# Patient Record
Sex: FEMALE | Race: WHITE | NOT HISPANIC OR LATINO | Employment: OTHER | URBAN - METROPOLITAN AREA
[De-identification: names, ages, dates, MRNs, and addresses within clinical notes are randomized per-mention and may not be internally consistent; named-entity substitution may affect disease eponyms.]

---

## 2018-05-25 ENCOUNTER — HISTORICAL (OUTPATIENT)
Dept: RADIOLOGY | Facility: HOSPITAL | Age: 63
End: 2018-05-25

## 2018-05-28 ENCOUNTER — HISTORICAL (OUTPATIENT)
Dept: ADMINISTRATIVE | Facility: HOSPITAL | Age: 63
End: 2018-05-28

## 2019-07-05 ENCOUNTER — HISTORICAL (OUTPATIENT)
Dept: RADIOLOGY | Facility: HOSPITAL | Age: 64
End: 2019-07-05

## 2022-05-03 NOTE — HISTORICAL [facility] C[facility]ER
This is a historical note converted from Ivis. Formatting and pictures may have been removed.  Please reference Ivis for original formatting and attached multimedia. Chief Complaint  Rt foot pain, can fell on foot yesterday  History of Present Illness  62-year-old female presents with right foot pain.??Associated symptoms of increased pain with?toe and ankle movement,?and swelling.? Symptom onset began yesterday when?a full can of?clean guard?fell from height onto?right foot. ?Patient?elevating foot?taking over-the-counter medication without resolution of symptoms.? Daughter suggested she be assessed. Patient reports she has anti-inflammatory prescribed by her doctor to aid in inflammation, but hasnt taken any at this time. Patient did not take her ASA today.  Review of Systems  Constitutional_no fever, no fatigue, no weakness  Musculoskeletal_rt foot?pain  Integumentary_no skin rash or abnormal lesion  Genitourinary_no loss of bladder function  Gastrointestinal_no loss of bowel function  Neurologic_no headache, no dizziness, no peripheral weakness or numbness  ?  Physical Exam  Vitals & Measurements  T:?36.9? ?C (Oral)? HR:?83(Peripheral)? BP:?120/78? SpO2:?98%?  HT:?155?cm? HT:?155?cm? WT:?78?kg? WT:?78?kg? BMI:?32.47?  General_well-developed well-nourished in no acute distress  Cardiac_regular rate and rhythm without murmurs gallops or rubs, mild swelling noted?at surface of right?foot  Musculoskeletal_decreased range of motion right ankle,?and second through fourth digit?of right foot. ?Tenderness upon palpation at right dorsal surface?of foot  Integumentary_no rashes or skin lesions present, mild redness noted?at dorsal surface of right?foot  Neurologic_ cranial nerves intact, no signs of peripheral neurological deficit, motor/sensory function intact  ?  Assessment/Plan  1.?Right foot pain  Modify activity as necessary, gentle stretching suggested. Rest, ice, elevate, and apply compression bandage as  tolerated.  Use either ice pack for pain relief or localized heat to promote healing as needed  Use medications either over-the-counter or prescription as prescribed/needed  Contact this clinic if not improved with this treatment plan over the next 7-14 days  ?Will call with final results from radiology reading. Initial screening of xray negative for fractures.  Patient should HOLD aspirin and anti-inflammatory meds today, and restart meds tomorrow. Patient will be given small amount of medication for breakthru pain, may cause drowsiness.  Ordered:  ketorolac, 30 mg, IM, Once-NOW, first dose 05/28/18 10:40:00 CDT, stop date 05/28/18 10:40:00 CDT  traMADol, 50 mg = 1 tab(s), Oral, TID, PRN PRN pain, May cause drowsiness. Use for breakthru pain., X 3 day(s), # 9 tab(s), 0 Refill(s), Pharmacy: [facility] Alivia Band > or = 3 < 5/YD  PC, 05/28/18 10:44:00 CDT, [facility], Routine, 05/28/18 10:44:00 CDT  Office/Outpatient Visit Level 3 Established [id number] PC, Right foot pain, 05/28/18 10:48:00 CDT  XR Foot Right Minimum 3 Views, Routine, 05/28/18 10:11:00 CDT, Blunt Trauma, None, Stretcher, Patient Has IV?, Rad Type, Right foot pain, Not Scheduled, 05/28/18 10:11:00 CDT  ?   Problem List/Past Medical History  Ongoing  Acid reflux  Anxiety  Bipolar  Depression  Hypercholesterolemia  Hypertension  Obesity  Panic attack  Restless legs syndrome  Historical  Back pain  borderline diabetes  Bruises easily  Bulging disc  can lie flat  can walk 2 blocks briskly w/o CP/SOB  Cataracts  no cardiologist  wears contacts  Wears glasses  Procedure/Surgical History  20361 -  CATARACT W/IOL 1 STA PHACO (Left) (03/10/2015), Extracapsular cataract removal with insertion of intraocular lens prosthesis (1 stage procedure), manual or mechanical technique (eg, irrigation and aspiration or phacoemulsification) (03/10/2015), Insertion of intraocular lens prosthesis at time of cataract extraction, one-stage  (03/10/2015), Phacoemulsification and aspiration of cataract (03/10/2015), Cataract (2015), Excision Lesion (Right) (07/01/2014), Excision of lesion or tissue of conjunctiva (07/01/2014), Excision of lesion, conjunctiva; up to 1 cm. (07/01/2014), ORIF left ankle with bone graft, Tonsillectomy and adenoidectomy, Tubal ligation.  Medications  ALPRAZOLAM TAB 1MG, 1 mg= 1 tab(s), Oral, BID  ASPIRIN CHILD 81MG TAB CHEW, 81 mg= 1 tab(s), Oral, Daily  EZETIMIBE TAB 10MG  FAMOTIDINE TAB 40MG  PIOGLITAZONE TAB 15MG, 15 mg= 1 tab(s), Oral, Daily  ROSUVASTATIN CALCIUM 20 MG, 20 mg= 1 tab(s), Oral, qPM  Toradol for IM, 30 mg, IM, Once-NOW  traMADol 50 mg oral tablet, 50 mg= 1 tab(s), Oral, TID, PRN  VALSART/HCTZ -25MG, 1 tab(s), Oral, Daily  Allergies  Penicillin  Plavix?(unknown)  Social History  Alcohol - Denies Alcohol Use, 06/26/2014  Current, 1-2 times per week, 10/22/2015  Employment/School - No Risk, 03/10/2015  Employed, Highest education level: High school., 06/26/2014  Home/Environment - No Risk, 03/10/2015  Lives with Significant other., 03/10/2015  Nutrition/Health - No Risk, 03/10/2015  Regular, 03/10/2015  Substance Abuse - Denies Substance Abuse, 06/26/2014  Never, 05/28/2018  Tobacco - High Risk, 03/10/2015  Current every day smoker, Cigarettes, 06/26/2014  Family History  CAD (coronary artery disease)...: Father and Brother.  Hypertension.: Mother.

## 2023-10-22 ENCOUNTER — HOSPITAL ENCOUNTER (EMERGENCY)
Facility: HOSPITAL | Age: 68
Discharge: HOME | End: 2023-10-22
Attending: EMERGENCY MEDICINE
Payer: MEDICARE

## 2023-10-22 VITALS
TEMPERATURE: 98 F | DIASTOLIC BLOOD PRESSURE: 91 MMHG | SYSTOLIC BLOOD PRESSURE: 161 MMHG | WEIGHT: 155 LBS | RESPIRATION RATE: 20 BRPM | OXYGEN SATURATION: 99 % | HEIGHT: 61 IN | BODY MASS INDEX: 29.27 KG/M2 | HEART RATE: 57 BPM

## 2023-10-22 DIAGNOSIS — R06.02 SOB (SHORTNESS OF BREATH): ICD-10-CM

## 2023-10-22 DIAGNOSIS — S22.068A OTHER CLOSED FRACTURE OF EIGHTH THORACIC VERTEBRA, INITIAL ENCOUNTER: Primary | ICD-10-CM

## 2023-10-22 LAB
ALBUMIN SERPL-MCNC: 3.6 G/DL (ref 3.4–4.8)
ALBUMIN/GLOB SERPL: 1.2 RATIO (ref 1.1–2)
ALP SERPL-CCNC: 88 UNIT/L (ref 40–150)
ALT SERPL-CCNC: 16 UNIT/L (ref 0–55)
AST SERPL-CCNC: 22 UNIT/L (ref 5–34)
BASOPHILS # BLD AUTO: 0.02 X10(3)/MCL
BASOPHILS NFR BLD AUTO: 0.3 %
BILIRUB SERPL-MCNC: 0.5 MG/DL
BNP BLD-MCNC: 103.6 PG/ML
BUN SERPL-MCNC: 22.5 MG/DL (ref 9.8–20.1)
CALCIUM SERPL-MCNC: 9.4 MG/DL (ref 8.4–10.2)
CHLORIDE SERPL-SCNC: 106 MMOL/L (ref 98–107)
CO2 SERPL-SCNC: 22 MMOL/L (ref 23–31)
CREAT SERPL-MCNC: 0.8 MG/DL (ref 0.55–1.02)
EOSINOPHIL # BLD AUTO: 0.07 X10(3)/MCL (ref 0–0.9)
EOSINOPHIL NFR BLD AUTO: 1.2 %
ERYTHROCYTE [DISTWIDTH] IN BLOOD BY AUTOMATED COUNT: 13.6 % (ref 11.5–17)
GFR SERPLBLD CREATININE-BSD FMLA CKD-EPI: >60 MLS/MIN/1.73/M2
GLOBULIN SER-MCNC: 3 GM/DL (ref 2.4–3.5)
GLUCOSE SERPL-MCNC: 96 MG/DL (ref 82–115)
HCT VFR BLD AUTO: 42.2 % (ref 37–47)
HGB BLD-MCNC: 14.3 G/DL (ref 12–16)
IMM GRANULOCYTES # BLD AUTO: 0.04 X10(3)/MCL (ref 0–0.04)
IMM GRANULOCYTES NFR BLD AUTO: 0.7 %
LYMPHOCYTES # BLD AUTO: 1.65 X10(3)/MCL (ref 0.6–4.6)
LYMPHOCYTES NFR BLD AUTO: 27.3 %
MCH RBC QN AUTO: 34.8 PG (ref 27–31)
MCHC RBC AUTO-ENTMCNC: 33.9 G/DL (ref 33–36)
MCV RBC AUTO: 102.7 FL (ref 80–94)
MONOCYTES # BLD AUTO: 0.48 X10(3)/MCL (ref 0.1–1.3)
MONOCYTES NFR BLD AUTO: 7.9 %
NEUTROPHILS # BLD AUTO: 3.78 X10(3)/MCL (ref 2.1–9.2)
NEUTROPHILS NFR BLD AUTO: 62.6 %
NRBC BLD AUTO-RTO: 0 %
PLATELET # BLD AUTO: 216 X10(3)/MCL (ref 130–400)
PMV BLD AUTO: 10.1 FL (ref 7.4–10.4)
POTASSIUM SERPL-SCNC: 4.5 MMOL/L (ref 3.5–5.1)
PROT SERPL-MCNC: 6.6 GM/DL (ref 5.8–7.6)
RBC # BLD AUTO: 4.11 X10(6)/MCL (ref 4.2–5.4)
SODIUM SERPL-SCNC: 139 MMOL/L (ref 136–145)
TROPONIN I SERPL-MCNC: <0.01 NG/ML (ref 0–0.04)
WBC # SPEC AUTO: 6.04 X10(3)/MCL (ref 4.5–11.5)

## 2023-10-22 PROCEDURE — 83880 ASSAY OF NATRIURETIC PEPTIDE: CPT

## 2023-10-22 PROCEDURE — 25000003 PHARM REV CODE 250: Performed by: PHYSICIAN ASSISTANT

## 2023-10-22 PROCEDURE — 85025 COMPLETE CBC W/AUTO DIFF WBC: CPT

## 2023-10-22 PROCEDURE — 80053 COMPREHEN METABOLIC PANEL: CPT

## 2023-10-22 PROCEDURE — 99285 EMERGENCY DEPT VISIT HI MDM: CPT | Mod: 25

## 2023-10-22 PROCEDURE — 93010 ELECTROCARDIOGRAM REPORT: CPT | Mod: ,,, | Performed by: INTERNAL MEDICINE

## 2023-10-22 PROCEDURE — 84484 ASSAY OF TROPONIN QUANT: CPT

## 2023-10-22 PROCEDURE — 93010 EKG 12-LEAD: ICD-10-PCS | Mod: ,,, | Performed by: INTERNAL MEDICINE

## 2023-10-22 PROCEDURE — 93005 ELECTROCARDIOGRAM TRACING: CPT

## 2023-10-22 RX ORDER — OXYCODONE AND ACETAMINOPHEN 10; 325 MG/1; MG/1
1 TABLET ORAL ONCE
Status: COMPLETED | OUTPATIENT
Start: 2023-10-22 | End: 2023-10-22

## 2023-10-22 RX ORDER — HYDROCODONE BITARTRATE AND ACETAMINOPHEN 5; 325 MG/1; MG/1
1 TABLET ORAL EVERY 6 HOURS PRN
Qty: 12 TABLET | Refills: 0 | Status: SHIPPED | OUTPATIENT
Start: 2023-10-22 | End: 2023-11-07

## 2023-10-22 RX ADMIN — OXYCODONE HYDROCHLORIDE AND ACETAMINOPHEN 1 TABLET: 10; 325 TABLET ORAL at 03:10

## 2023-10-22 NOTE — FIRST PROVIDER EVALUATION
"Medical screening examination initiated.  I have conducted a focused provider triage encounter, findings are as follows:    Brief history of present illness:  68 year old female presents to ER with c/o lower back pain since fall on 10/03/23. Patient states that she was told that she a compression fracture in her back. Patient reports shortness of breath onset last week.     Vitals:    10/22/23 1058   BP: (!) 183/93   BP Location: Left arm   Patient Position: Sitting   Pulse: 80   Resp: 18   Temp: 98 °F (36.7 °C)   TempSrc: Oral   SpO2: 97%   Weight: 70.3 kg (155 lb)   Height: 5' 1" (1.549 m)       Pertinent physical exam:  Awake and alert, nad    Brief workup plan:  labs, imaging     Preliminary workup initiated; this workup will be continued and followed by the physician or advanced practice provider that is assigned to the patient when roomed.  "

## 2023-10-31 ENCOUNTER — TELEPHONE (OUTPATIENT)
Dept: NEUROSURGERY | Facility: CLINIC | Age: 68
End: 2023-10-31
Payer: MEDICARE

## 2023-10-31 DIAGNOSIS — S22.080A COMPRESSION FRACTURE OF T11 VERTEBRA, INITIAL ENCOUNTER: ICD-10-CM

## 2023-10-31 DIAGNOSIS — M51.34 BULGING OF THORACIC INTERVERTEBRAL DISC: Primary | ICD-10-CM

## 2023-11-01 NOTE — TELEPHONE ENCOUNTER
Please schedule the patient sooner rather than later on either my or Adelina's schedule. If she wishes to move forward with Dr. Camacho she will need AP/Lat views of the thoracici spine as well as AP/Lat/Flex/Ext views of the lumbar spine prior to her visit. Thanks

## 2023-11-03 NOTE — TELEPHONE ENCOUNTER
I tried to call the patient to see who she would like to move forward with. She did not answer so I LMOM.

## 2023-11-06 NOTE — TELEPHONE ENCOUNTER
The patient returned my call and spoke with Yoana. She stated she would like to move forward with Dr. Rodriguez. I spoke with the patient to get her scheduled. I scheduled her with Sagrario for 11/7/23 at 1:30. The patient was compliant w date and time.

## 2023-11-07 ENCOUNTER — OFFICE VISIT (OUTPATIENT)
Dept: NEUROSURGERY | Facility: CLINIC | Age: 68
End: 2023-11-07
Payer: MEDICARE

## 2023-11-07 VITALS
SYSTOLIC BLOOD PRESSURE: 115 MMHG | HEART RATE: 108 BPM | HEIGHT: 61 IN | WEIGHT: 154 LBS | RESPIRATION RATE: 16 BRPM | DIASTOLIC BLOOD PRESSURE: 80 MMHG | BODY MASS INDEX: 29.07 KG/M2

## 2023-11-07 DIAGNOSIS — M51.24 THORACIC DISC HERNIATION: ICD-10-CM

## 2023-11-07 DIAGNOSIS — S22.060D CLOSED WEDGE COMPRESSION FRACTURE OF T8 VERTEBRA WITH ROUTINE HEALING: Primary | ICD-10-CM

## 2023-11-07 PROCEDURE — 99204 OFFICE O/P NEW MOD 45 MIN: CPT | Mod: ,,, | Performed by: NURSE PRACTITIONER

## 2023-11-07 PROCEDURE — 99204 PR OFFICE/OUTPT VISIT, NEW, LEVL IV, 45-59 MIN: ICD-10-PCS | Mod: ,,, | Performed by: NURSE PRACTITIONER

## 2023-11-07 RX ORDER — ALENDRONATE SODIUM 70 MG/1
TABLET ORAL
COMMUNITY
Start: 2023-10-16 | End: 2023-11-20

## 2023-11-07 RX ORDER — VILAZODONE HYDROCHLORIDE 40 MG/1
TABLET ORAL DAILY
COMMUNITY
End: 2023-11-07

## 2023-11-07 RX ORDER — CELECOXIB 200 MG/1
200 CAPSULE ORAL 2 TIMES DAILY
COMMUNITY
Start: 2023-07-20 | End: 2023-11-07

## 2023-11-07 RX ORDER — ALPRAZOLAM 1 MG/1
1 TABLET ORAL 2 TIMES DAILY PRN
COMMUNITY
Start: 2023-10-30

## 2023-11-07 RX ORDER — ICOSAPENT ETHYL 1000 MG/1
CAPSULE ORAL 2 TIMES DAILY
COMMUNITY
End: 2023-11-07

## 2023-11-07 RX ORDER — EZETIMIBE 10 MG/1
10 TABLET ORAL DAILY
COMMUNITY
Start: 2023-10-04

## 2023-11-07 RX ORDER — DICLOFENAC SODIUM 75 MG/1
75 TABLET, DELAYED RELEASE ORAL 2 TIMES DAILY
COMMUNITY
Start: 2023-10-04

## 2023-11-07 RX ORDER — INCLISIRAN 284 MG/1.5ML
INJECTION, SOLUTION SUBCUTANEOUS
COMMUNITY
Start: 2023-10-24 | End: 2023-11-07

## 2023-11-07 RX ORDER — OXYCODONE AND ACETAMINOPHEN 5; 325 MG/1; MG/1
1 TABLET ORAL EVERY 12 HOURS PRN
COMMUNITY
End: 2023-11-07

## 2023-11-07 RX ORDER — FAMOTIDINE 40 MG/1
TABLET, FILM COATED ORAL DAILY
COMMUNITY
End: 2023-11-07

## 2023-11-07 RX ORDER — HYDROCODONE BITARTRATE AND ACETAMINOPHEN 10; 300 MG/1; MG/1
TABLET ORAL EVERY 6 HOURS PRN
COMMUNITY
Start: 2023-10-30 | End: 2024-01-10 | Stop reason: ALTCHOICE

## 2023-11-07 RX ORDER — ROSUVASTATIN CALCIUM 20 MG/1
TABLET, COATED ORAL DAILY
COMMUNITY

## 2023-11-07 RX ORDER — SERTRALINE HYDROCHLORIDE 100 MG/1
TABLET, FILM COATED ORAL DAILY
COMMUNITY
End: 2023-11-07

## 2023-11-07 RX ORDER — PANTOPRAZOLE SODIUM 20 MG/1
1 TABLET, DELAYED RELEASE ORAL DAILY
COMMUNITY
End: 2023-11-07

## 2023-11-07 RX ORDER — METHOCARBAMOL 750 MG/1
TABLET, FILM COATED ORAL 2 TIMES DAILY PRN
COMMUNITY
End: 2023-11-07

## 2023-11-07 RX ORDER — NAPROXEN SODIUM 220 MG/1
TABLET, FILM COATED ORAL DAILY
COMMUNITY
End: 2023-11-07

## 2023-11-07 RX ORDER — VALSARTAN AND HYDROCHLOROTHIAZIDE 320; 12.5 MG/1; MG/1
1 TABLET, FILM COATED ORAL DAILY
COMMUNITY

## 2023-11-07 RX ORDER — ARIPIPRAZOLE 5 MG/1
TABLET ORAL DAILY
COMMUNITY
End: 2023-11-07

## 2023-11-07 NOTE — PROGRESS NOTES
[facility]  History & Physical  Neurosurgery      Mirela Andrade   [id number]   1955       SUBJECTIVE:     CHIEF COMPLAINT:  Mid back pain    HPI:  Mireal Andrade is a 68 y.o. female who presents for neurosurgical evaluation at the recommendation of Dr. Paul. The patient presents today describing sharp stabbing throbbing and shooting mid back pain that radiates up into the parascapular region as well as into the left-greater-than-right flank and abdomen.  The patient states the symptoms began following a fall in her bathtub on 10/3/2023.  She also reports chronic right lower back pain with numbness and pain radiating into the right buttock and right leg.  The patient states these symptoms have been present since prior to her L3-S1 lumbar fusion and have progressed following her recent fall.  She reports chronic difficulty with her balance and feeling unsteady when walking.  She is denying any changes in bowel or bladder function today.  She rates her pain a 9/10 at this time.  She states she sees and mild relief with utilization of Norco.  She attempted to wear her prescribed TLSO brace although she states it was too uncomfortable.  She is not resting at night secondary to pain.      Past Medical History:   Diagnosis Date    Bipolar disorder, unspecified     Bulging of thoracic intervertebral disc     Closed fracture of one rib of right side     Compression fracture of T11 vertebra     Hepatic steatosis     HLD (hyperlipidemia)     HTN (hypertension)     Insomnia     Occlusion and stenosis of bilateral carotid arteries     Other idiopathic peripheral autonomic neuropathy     RLS (restless legs syndrome)        Past Surgical History:   Procedure Laterality Date    ANKLE SURGERY Left     HYSTERECTOMY      LUMBAR SPINE SURGERY      TONSILLECTOMY         History reviewed. No pertinent family history.    Social History     Socioeconomic History    Marital status:    Tobacco Use     "Smoking status: Every Day     Types: Cigarettes    Smokeless tobacco: Never        Review of patient's allergies indicates:   Allergen Reactions    Clopidogrel Other (See Comments)     Other reaction(s): protruding vessels, unknown    Penicillin      Other reaction(s): rash, hives    Ibuprofen Nausea Only and Other (See Comments)    Penicillins Other (See Comments) and Rash        Current Outpatient Medications   Medication Instructions    alendronate (FOSAMAX) 70 MG tablet Every 30 days    ALPRAZolam (XANAX) 1 mg, Oral, 2 times daily PRN    diclofenac (VOLTAREN) 75 mg, Oral, 2 times daily    ezetimibe (ZETIA) 10 mg, Oral, Daily    HYDROcodone-acetaminophen  mg Tab Every 6 hours PRN    rosuvastatin (CRESTOR) 20 MG tablet Daily    valsartan-hydrochlorothiazide (DIOVAN-HCT) 320-12.5 mg per tablet 1 tablet, Oral, Daily          Review of Systems   Constitutional:  Negative for chills, fever and weight loss.   HENT:  Negative for congestion, hearing loss, nosebleeds and tinnitus.    Eyes:  Negative for blurred vision, double vision and photophobia.   Respiratory:  Negative for cough, shortness of breath and wheezing.    Cardiovascular:  Negative for chest pain, palpitations and leg swelling.   Gastrointestinal:  Negative for constipation, diarrhea, nausea and vomiting.   Genitourinary:  Negative for dysuria, frequency and urgency.   Musculoskeletal:  Positive for back pain. Negative for falls and neck pain.   Skin:  Negative for itching and rash.   Neurological:  Positive for tingling. Negative for dizziness, tremors, sensory change, speech change, seizures, loss of consciousness and headaches.   Psychiatric/Behavioral:  Negative for depression, hallucinations and memory loss. The patient is not nervous/anxious.        OBJECTIVE:     Visit Vitals  /80 (BP Location: Left arm, Patient Position: Sitting)   Pulse 108   Resp 16   Ht 5' 1" (1.549 m)   Wt 69.9 kg (154 lb)   BMI 29.10 kg/m²        Physical " Exam    General:  Pleasant, Well-nourished, Well-groomed.    Cardiovascular:  Neck is supple.  There are no carotid bruits.  Heart has regular rate and rhythm.    Lungs:  Breathing is quiet, non-lablored    Abdomen:  Soft, non-tender, non-distended.    Neurological:  Muscle strength against resistance:   Right Left   Deltoid (C5) 5/5 5/5   Biceps (C5/6) 5/5 5/5   Wrist Flexors (C5/6) 5/5 5/5   Triceps (C7) 5/5 5/5   Wrist extension (C7) 5/5 5/5   Finger abduction (C8) 5/5 5/5    5/5 5/5        Hip abduction 5-/5 5-/5   Hip adduction 5-/5 5-/5   Hip flexion (L2) 5-/5 5-/5   Knee extension (L3) 5/5 5/5   Knee flexion (L4) 5/5 5/5   Dorsiflexion (L5) 5/5 5/5   EHL (L5) 5/5 5/5   Plantar flexion (S1) 5/5 5/5   Questionable effort on muscular exam secondary to pain  Sensation is intact to primary modalities in bilateral upper and lower extremities.    Reflexes:  Negative Babinski, Clonus, Avila, Tinel's, and Phalen's bilaterally.  Gait is  slow and cautious  Coordination is normal.  No tremor noted.    Imaging:  All pertinent neuroimaging independently reviewed. Discussed these findings in detail with the patient.    MRI of the thoracic spine dated 10/17/2023 reveal subacute compression fracture at T8 with right paracentral disc extrusion at T10-11 causing moderate dural sac indentation.  There does seem to be some compression at T10 as well without marrow edema.    ASSESSMENT:       ICD-10-CM ICD-9-CM   1. Closed wedge compression fracture of T8 vertebra with routine healing  S22.060D V54.17   2. Thoracic disc herniation  M51.24 722.11       PLAN:     1. Closed wedge compression fracture of T8 vertebra with routine healing  - Ambulatory referral/consult to Neurology; Future    2. Thoracic disc herniation    Mirela Andrade presents today describing intense stabbing pain mid back as well as spasming and radiation of pain into the abdomen.  I did take the time to review the patient's recent CT scans as well as  MRI with her in clinic today.  Her daughter accompanied her on this visit via speaker phone.  Given the intensity of the patient's symptoms I would like to refer her on for consultation regarding possible kyphoplasty with Dr. Dye.  We did discuss it is unclear at this time if some of her symptoms are in fact coming from the disc herniation seen at T10-11.  We will tentatively schedule the patient to follow up in clinic on an as-needed basis.  Should she be found to be a candidate for kyphoplasty and continue to have symptoms following this procedure, she was advised to notify our office at which time we will re-evaluate her symptoms regarding potential treatment options.  She was encouraged to attempt utilization of Tylenol during the day as she states she does not like to take the Norco that was previously prescribed.  I also emphasized the importance of remaining compliant with her brace as much as possible.  She and her daughter verbalize understanding.    Follow up if symptoms worsen or fail to improve.      E/M Level Based On Time:   15 minutes spent on reviewing chart, which includes interpreting lab results and diagnostic tests.   25 minutes spent in the room with the patient performing a history and physical exam, counseling or educating the patient/caregiver, prescribing medications, ordering labwork/diagnostic tests, or placing referrals.   5 minutes spent collaborating plan of care with physician.   5 minutes spent documenting all relevant clinical informationin the electronic health record.     Total Time Spent: 50 minutes       STANISLAV Woodard    Disclaimer:  This note is prepared using voice recognition software and as such is likely to have errors despite attempts at proofreading. Please contact me for questions.

## 2023-11-20 ENCOUNTER — OFFICE VISIT (OUTPATIENT)
Dept: NEUROLOGY | Facility: CLINIC | Age: 68
End: 2023-11-20
Payer: MEDICARE

## 2023-11-20 VITALS
DIASTOLIC BLOOD PRESSURE: 82 MMHG | BODY MASS INDEX: 29.07 KG/M2 | SYSTOLIC BLOOD PRESSURE: 148 MMHG | HEIGHT: 61 IN | WEIGHT: 154 LBS

## 2023-11-20 DIAGNOSIS — S22.060D CLOSED WEDGE COMPRESSION FRACTURE OF T8 VERTEBRA WITH ROUTINE HEALING: ICD-10-CM

## 2023-11-20 DIAGNOSIS — S22.060G COMPRESSION FRACTURE OF T8 VERTEBRA WITH DELAYED HEALING: ICD-10-CM

## 2023-11-20 DIAGNOSIS — S22.060A COMPRESSION FRACTURE OF T8 VERTEBRA, INITIAL ENCOUNTER: Primary | ICD-10-CM

## 2023-11-20 DIAGNOSIS — M80.08XA AGE-RELATED OSTEOPOROSIS WITH CURRENT PATHOLOGICAL FRACTURE, VERTEBRA(E), INITIAL ENCOUNTER FOR FRACTURE: ICD-10-CM

## 2023-11-20 PROCEDURE — 99204 OFFICE O/P NEW MOD 45 MIN: CPT | Mod: S$PBB,,, | Performed by: PSYCHIATRY & NEUROLOGY

## 2023-11-20 PROCEDURE — 99213 OFFICE O/P EST LOW 20 MIN: CPT | Mod: PBBFAC | Performed by: PSYCHIATRY & NEUROLOGY

## 2023-11-20 PROCEDURE — 99999 PR PBB SHADOW E&M-EST. PATIENT-LVL III: ICD-10-PCS | Mod: PBBFAC,,, | Performed by: PSYCHIATRY & NEUROLOGY

## 2023-11-20 PROCEDURE — 99999 PR PBB SHADOW E&M-EST. PATIENT-LVL III: CPT | Mod: PBBFAC,,, | Performed by: PSYCHIATRY & NEUROLOGY

## 2023-11-20 PROCEDURE — 99204 PR OFFICE/OUTPT VISIT, NEW, LEVL IV, 45-59 MIN: ICD-10-PCS | Mod: S$PBB,,, | Performed by: PSYCHIATRY & NEUROLOGY

## 2023-11-20 NOTE — PROGRESS NOTES
Neurology Office Visit  Neurology    Mirela Andrade is a 68 y.o. female for VCF.  S/p fall 1o/2023.  Imaging shows T8 VCF with edema.  Has tried conservative treatment, including LSO, without relief.  +Osteoporosis.    ROS:  Review of Systems   All other systems reviewed and are negative.     Past Medical History:   Diagnosis Date    Bipolar disorder, unspecified     Bulging of thoracic intervertebral disc     Closed fracture of one rib of right side     Compression fracture of T11 vertebra     Hepatic steatosis     HLD (hyperlipidemia)     HTN (hypertension)     Insomnia     Occlusion and stenosis of bilateral carotid arteries     Other idiopathic peripheral autonomic neuropathy     RLS (restless legs syndrome)      Past Surgical History:   Procedure Laterality Date    ANKLE SURGERY Left     HYSTERECTOMY      LUMBAR SPINE SURGERY      TONSILLECTOMY       History reviewed. No pertinent family history.  Review of patient's allergies indicates:   Allergen Reactions    Clopidogrel Other (See Comments)     Other reaction(s): protruding vessels, unknown    Penicillin      Other reaction(s): rash, hives    Ibuprofen Nausea Only and Other (See Comments)    Penicillins Other (See Comments) and Rash       Current Outpatient Medications:     ALPRAZolam (XANAX) 1 MG tablet, Take 1 mg by mouth 2 (two) times daily as needed., Disp: , Rfl:     diclofenac (VOLTAREN) 75 MG EC tablet, Take 75 mg by mouth 2 (two) times daily., Disp: , Rfl:     ezetimibe (ZETIA) 10 mg tablet, Take 10 mg by mouth Daily., Disp: , Rfl:     HYDROcodone-acetaminophen  mg Tab, every 6 (six) hours as needed., Disp: , Rfl:     rosuvastatin (CRESTOR) 20 MG tablet, Daily., Disp: , Rfl:     valsartan-hydrochlorothiazide (DIOVAN-HCT) 320-12.5 mg per tablet, Take 1 tablet by mouth once daily., Disp: , Rfl:   Outpatient Medications Marked as Taking for the 11/20/23 encounter (Office Visit) with Payam Dye MD   Medication Sig Dispense Refill     ALPRAZolam (XANAX) 1 MG tablet Take 1 mg by mouth 2 (two) times daily as needed.      diclofenac (VOLTAREN) 75 MG EC tablet Take 75 mg by mouth 2 (two) times daily.      ezetimibe (ZETIA) 10 mg tablet Take 10 mg by mouth Daily.      HYDROcodone-acetaminophen  mg Tab every 6 (six) hours as needed.      rosuvastatin (CRESTOR) 20 MG tablet Daily.      valsartan-hydrochlorothiazide (DIOVAN-HCT) 320-12.5 mg per tablet Take 1 tablet by mouth once daily.       Social History     Tobacco Use    Smoking status: Every Day     Types: Cigarettes    Smokeless tobacco: Never   Substance Use Topics    Alcohol use: Not Currently    Drug use: Never         Vitals:    11/20/23 0938   BP: (!) 148/82     Gen NAD  HEENT NC/AT  CV RRR, no carotid bruits  Resp clear  GI soft  Ext no C/C/E  Neuro  AAOx4  Speech fluent, appropriate  EOMI, PERRLA, VFF, no papilledema  Normal facial strength, sensation  Tongue and palate midline  Motor 5/5  Sensation TTP T8  DTRs symmetric  Coord intact  Gait Normal    Assessment: T8 VCF    Plan: Kyphoplasty

## 2023-11-27 ENCOUNTER — TELEPHONE (OUTPATIENT)
Dept: NEUROLOGY | Facility: CLINIC | Age: 68
End: 2023-11-27
Payer: MEDICARE

## 2023-11-27 NOTE — TELEPHONE ENCOUNTER
Pt called requesting c/b to discuss scheduling kyphoplasty.  Pt was advised that you are currently out of the clinic.    CB# [phone]

## 2023-11-28 NOTE — ED PROVIDER NOTES
Encounter Date: 10/22/2023       History     Chief Complaint   Patient presents with    Back Pain     Thoracic back pain since trip and fall 10/3. Had MRI done 10/8  and showed Thoracic compression fracture. Also c/o SOB, onset last week    Shortness of Breath     68yoF with history of thoracic vertebra fracture here for back pain. No meds taken today.     The history is provided by the patient. No  was used.   Back Pain   This is a chronic problem. The current episode started several weeks ago. The problem occurs constantly. The problem has been unchanged. The pain is associated with falling. The pain is present in the thoracic spine. The pain does not radiate. The pain is at a severity of 4/10. The symptoms are aggravated by bending. Pertinent negatives include no chest pain, no fever, no numbness, no bowel incontinence, no perianal numbness and no bladder incontinence. The treatment provided no relief.     Review of patient's allergies indicates:   Allergen Reactions    Clopidogrel Other (See Comments)     Other reaction(s): protruding vessels, unknown    Penicillin      Other reaction(s): rash, hives    Ibuprofen Nausea Only and Other (See Comments)    Penicillins Other (See Comments) and Rash     Past Medical History:   Diagnosis Date    Bipolar disorder, unspecified     Bulging of thoracic intervertebral disc     Closed fracture of one rib of right side     Compression fracture of T11 vertebra     Hepatic steatosis     HLD (hyperlipidemia)     HTN (hypertension)     Insomnia     Occlusion and stenosis of bilateral carotid arteries     Other idiopathic peripheral autonomic neuropathy     RLS (restless legs syndrome)      Past Surgical History:   Procedure Laterality Date    ANKLE SURGERY Left     HYSTERECTOMY      LUMBAR SPINE SURGERY      TONSILLECTOMY       No family history on file.  Social History     Tobacco Use    Smoking status: Every Day     Types: Cigarettes    Smokeless tobacco:  Never   Substance Use Topics    Alcohol use: Not Currently    Drug use: Never     Review of Systems   Constitutional:  Negative for fatigue and fever.   Cardiovascular:  Negative for chest pain.   Gastrointestinal:  Negative for bowel incontinence.   Genitourinary:  Negative for bladder incontinence.   Musculoskeletal:  Positive for back pain.   Neurological:  Negative for numbness.   All other systems reviewed and are negative.      Physical Exam     Initial Vitals [10/22/23 1058]   BP Pulse Resp Temp SpO2   (!) 183/93 80 18 98 °F (36.7 °C) 97 %      MAP       --         Physical Exam    Nursing note and vitals reviewed.  Constitutional: She appears well-developed.   Eyes: Pupils are equal, round, and reactive to light. Right eye exhibits no discharge.   Pulmonary/Chest: No respiratory distress. She has no wheezes.     Neurological: She is oriented to person, place, and time. She has normal reflexes.   CN grossly intact.PERRLA. FROM head and neck in all directions. 5/5 muscle strength in bilateral iliopsoas, quadriceps, dorsiflexion,plantar flexion, inversion, eversion, and hamstring function. Intact dermatomes lower extremities. Point tenderness over thoracic spine.         CLINICS CALLED OUT TO GIVE PATIENT TLSO BRACE    ED Course   Procedures  Labs Reviewed   COMPREHENSIVE METABOLIC PANEL - Abnormal; Notable for the following components:       Result Value    Carbon Dioxide 22 (*)     Blood Urea Nitrogen 22.5 (*)     All other components within normal limits   B-TYPE NATRIURETIC PEPTIDE - Abnormal; Notable for the following components:    Natriuretic Peptide 103.6 (*)     All other components within normal limits   CBC WITH DIFFERENTIAL - Abnormal; Notable for the following components:    RBC 4.11 (*)     .7 (*)     MCH 34.8 (*)     All other components within normal limits   TROPONIN I - Normal   CBC W/ AUTO DIFFERENTIAL    Narrative:     The following orders were created for panel order CBC auto  differential.  Procedure                               Abnormality         Status                     ---------                               -----------         ------                     CBC with Differential[[id number]]       Abnormal            Final result                 Please view results for these tests on the individual orders.        ECG Results              EKG 12-lead (Final result)  Result time 10/22/23 18:07:54      Final result by Interface, Lab In [facility] (10/22/23 18:07:54)                   Narrative:    Test Reason : R06.02,    Vent. Rate : 070 BPM     Atrial Rate : 070 BPM     P-R Int : 114 ms          QRS Dur : 074 ms      QT Int : 402 ms       P-R-T Axes : 034 -01 003 degrees     QTc Int : 434 ms    Sinus rhythm with Premature supraventricular complexes  Baseline/motion artifact  Borderline Abnormal ECG  No previous ECGs available  Confirmed by Catrachito Leija MD ([id number]) on 10/22/2023 6:07:44 PM    Referred By:             Confirmed By:Catrachito Leija MD                                     EKG 12-LEAD (Final result)  Result time 10/28/23 12:58:22      Final result by Unknown User (10/28/23 12:58:22)                                      Imaging Results              CT Thoracic Spine Without Contrast (Final result)  Result time 10/22/23 11:57:30      Final result by Judy Marcano MD (10/22/23 11:57:30)                   Impression:      Age indeterminate inferior endplate insufficiency fractures at T8 and T10 with mild loss of height.    Right central disc protrusion at T10-T11 with narrowing of the right lateral recess      Electronically signed by: Judy Marcano  Date:    10/22/2023  Time:    11:57               Narrative:    EXAMINATION:  CT THORACIC SPINE WITHOUT CONTRAST    CLINICAL HISTORY:  Mid-back pain, compression fracture suspected;    TECHNIQUE:  Low dose helical acquired images with axial, sagittal and coronal reformations though the thoracic spine.  Contrast was not  administered.    All CT scans at this location are performed using dose optimization techniques as appropriate to a performed exam including the following automated exposure control, adjustment of the mA and/or kV according to patient size and/or use of iterative reconstruction technique    DLP: 1484 mGycm    COMPARISON:  CT chest 07/05/2019    FINDINGS:  NUMBERING: There are diminutive 12th ribs.    BONES: 20% loss of height with sclerosis at the inferior endplate of T8, age indeterminate but new from 2019 exam.  10% loss of height with sclerosis at the inferior endplate of T10, age indeterminate but new from 2019 exam.  Normal alignment.    DISCS: Right central disc protrusion at T10-T11 measuring approximately 7 mm.  This narrows the right lateral recess.    SOFT TISSUES: Regional soft tissues are unremarkable.                                       CT Lumbar Spine Without Contrast (Final result)  Result time 10/22/23 11:52:20      Final result by José Miguel Roberto MD (10/22/23 11:52:20)                   Impression:      No acute traumatic injury identified.      Electronically signed by: José Miguel Roberto  Date:    10/22/2023  Time:    11:52               Narrative:    EXAMINATION:  CT LUMBAR SPINE WITHOUT CONTRAST    CLINICAL HISTORY:  Low back pain, trauma;    TECHNIQUE:  Noncontrast CT images of the lumbar spine. Axial, coronal, and sagittal reformatted images are reviewed. Dose length product is 1484 mGycm. Automatic exposure control, adjustment of mA/kV or iterative reconstruction technique was used to limit radiation dose.    COMPARISON:  CT 02/02/2017    FINDINGS:  Transpedicular screws and bilateral fixation rods from L3 through the sacrum with interbody implants.  No acute fracture identified.  No significant listhesis.  Moderate degenerative changes at L2-3.  Numerous aortic and iliac artery calcifications.                                       X-Ray Chest PA And Lateral (Final result)  Result time  10/22/23 11:20:20      Final result by José Miguel Roberto MD (10/22/23 11:20:20)                   Impression:      No acute pulmonary process appreciated.      Electronically signed by: José Miguel Roberto  Date:    10/22/2023  Time:    11:20               Narrative:    EXAMINATION:  XR CHEST PA AND LATERAL    CLINICAL HISTORY:  Shortness of breath    TECHNIQUE:  PA and lateral radiographs of the chest    COMPARISON:  Radiography 2016    FINDINGS:  Normal cardiac silhouette. The lungs are well-inflated. No consolidation identified. No pleural effusion or discernible pneumothorax.                                    X-Rays:   Independently Interpreted Readings:   Other Readings:  DISC PROTRUSION AT T10-T11.    Medications   oxyCODONE-acetaminophen  mg per tablet 1 tablet (1 tablet Oral Given 10/22/23 1521)     Medical Decision Making  68yoF with history of thoracic vertebra fracture here for back pain. No meds taken today.  NO obvious fracture on CT but brace given prior to discharge to give support.     Problems Addressed:  Other closed fracture of eighth thoracic vertebra, initial encounter: acute illness or injury    Risk  Prescription drug management.  Decision regarding hospitalization.                                      Clinical Impression:  Final diagnoses:  [R06.02] SOB (shortness of breath)  [S22.068A] Other closed fracture of eighth thoracic vertebra, initial encounter (Primary)          ED Disposition Condition    Discharge Stable          ED Prescriptions       Medication Sig Dispense Start Date End Date Auth. Provider    HYDROcodone-acetaminophen (NORCO) 5-325 mg per tablet () Take 1 tablet by mouth every 6 (six) hours as needed for Pain. 12 tablet 10/22/2023 2023 Phylicia Draper PA          Follow-up Information       Follow up With Specialties Details Why Contact Info    Michael Paul MD Family Medicine  As needed, If symptoms worsen [address] [place]  [id number]               Phylicia Draper PA  11/28/23 1213

## 2023-11-30 ENCOUNTER — HOSPITAL ENCOUNTER (OUTPATIENT)
Dept: INTERVENTIONAL RADIOLOGY/VASCULAR | Facility: HOSPITAL | Age: 68
Discharge: HOME | End: 2023-11-30
Attending: PSYCHIATRY & NEUROLOGY | Admitting: PSYCHIATRY & NEUROLOGY
Payer: MEDICARE

## 2023-11-30 ENCOUNTER — ANESTHESIA EVENT (OUTPATIENT)
Dept: INTERVENTIONAL RADIOLOGY/VASCULAR | Facility: HOSPITAL | Age: 68
End: 2023-11-30
Payer: MEDICARE

## 2023-11-30 VITALS
DIASTOLIC BLOOD PRESSURE: 91 MMHG | OXYGEN SATURATION: 97 % | HEART RATE: 86 BPM | HEIGHT: 61 IN | BODY MASS INDEX: 28.05 KG/M2 | SYSTOLIC BLOOD PRESSURE: 161 MMHG | RESPIRATION RATE: 13 BRPM | WEIGHT: 148.56 LBS

## 2023-11-30 DIAGNOSIS — M80.08XA AGE-RELATED OSTEOPOROSIS WITH CURRENT PATHOLOGICAL FRACTURE, VERTEBRA(E), INITIAL ENCOUNTER FOR FRACTURE: ICD-10-CM

## 2023-11-30 DIAGNOSIS — S22.060A: ICD-10-CM

## 2023-11-30 DIAGNOSIS — S22.060D CLOSED WEDGE COMPRESSION FRACTURE OF T8 VERTEBRA WITH ROUTINE HEALING: ICD-10-CM

## 2023-11-30 DIAGNOSIS — S22.060A COMPRESSION FRACTURE OF T8 VERTEBRA, INITIAL ENCOUNTER: ICD-10-CM

## 2023-11-30 DIAGNOSIS — S22.060G COMPRESSION FRACTURE OF T8 VERTEBRA WITH DELAYED HEALING: ICD-10-CM

## 2023-11-30 LAB
ANION GAP SERPL CALC-SCNC: 11 MEQ/L
BASOPHILS # BLD AUTO: 0.03 X10(3)/MCL
BASOPHILS NFR BLD AUTO: 0.4 %
BUN SERPL-MCNC: 17.8 MG/DL (ref 9.8–20.1)
CALCIUM SERPL-MCNC: 9.6 MG/DL (ref 8.4–10.2)
CHLORIDE SERPL-SCNC: 105 MMOL/L (ref 98–107)
CO2 SERPL-SCNC: 22 MMOL/L (ref 23–31)
CREAT SERPL-MCNC: 0.75 MG/DL (ref 0.55–1.02)
CREAT/UREA NIT SERPL: 24
EOSINOPHIL # BLD AUTO: 0.03 X10(3)/MCL (ref 0–0.9)
EOSINOPHIL NFR BLD AUTO: 0.4 %
ERYTHROCYTE [DISTWIDTH] IN BLOOD BY AUTOMATED COUNT: 13 % (ref 11.5–17)
GFR SERPLBLD CREATININE-BSD FMLA CKD-EPI: >60 MLS/MIN/1.73/M2
GLUCOSE SERPL-MCNC: 110 MG/DL (ref 82–115)
HCT VFR BLD AUTO: 45.8 % (ref 37–47)
HGB BLD-MCNC: 15.1 G/DL (ref 12–16)
IMM GRANULOCYTES # BLD AUTO: 0.02 X10(3)/MCL (ref 0–0.04)
IMM GRANULOCYTES NFR BLD AUTO: 0.3 %
INR PPP: 0.9
LYMPHOCYTES # BLD AUTO: 1.57 X10(3)/MCL (ref 0.6–4.6)
LYMPHOCYTES NFR BLD AUTO: 21.8 %
MCH RBC QN AUTO: 33.9 PG (ref 27–31)
MCHC RBC AUTO-ENTMCNC: 33 G/DL (ref 33–36)
MCV RBC AUTO: 102.9 FL (ref 80–94)
MONOCYTES # BLD AUTO: 0.54 X10(3)/MCL (ref 0.1–1.3)
MONOCYTES NFR BLD AUTO: 7.5 %
NEUTROPHILS # BLD AUTO: 5.01 X10(3)/MCL (ref 2.1–9.2)
NEUTROPHILS NFR BLD AUTO: 69.6 %
NRBC BLD AUTO-RTO: 0 %
PLATELET # BLD AUTO: 218 X10(3)/MCL (ref 130–400)
PMV BLD AUTO: 9.7 FL (ref 7.4–10.4)
POTASSIUM SERPL-SCNC: 4.5 MMOL/L (ref 3.5–5.1)
PROTHROMBIN TIME: 12.1 SECONDS (ref 12.5–14.5)
RBC # BLD AUTO: 4.45 X10(6)/MCL (ref 4.2–5.4)
SODIUM SERPL-SCNC: 138 MMOL/L (ref 136–145)
WBC # SPEC AUTO: 7.2 X10(3)/MCL (ref 4.5–11.5)

## 2023-11-30 PROCEDURE — D9220A PRA ANESTHESIA: ICD-10-PCS | Mod: ANES,,, | Performed by: ANESTHESIOLOGY

## 2023-11-30 PROCEDURE — C1713 ANCHOR/SCREW BN/BN,TIS/BN: HCPCS | Performed by: PSYCHIATRY & NEUROLOGY

## 2023-11-30 PROCEDURE — 80048 BASIC METABOLIC PNL TOTAL CA: CPT | Performed by: PSYCHIATRY & NEUROLOGY

## 2023-11-30 PROCEDURE — 37000008 HC ANESTHESIA 1ST 15 MINUTES: Performed by: PSYCHIATRY & NEUROLOGY

## 2023-11-30 PROCEDURE — 85025 COMPLETE CBC W/AUTO DIFF WBC: CPT | Performed by: PSYCHIATRY & NEUROLOGY

## 2023-11-30 PROCEDURE — D9220A PRA ANESTHESIA: ICD-10-PCS | Mod: CRNA,,, | Performed by: NURSE ANESTHETIST, CERTIFIED REGISTERED

## 2023-11-30 PROCEDURE — C1726 CATH, BAL DIL, NON-VASCULAR: HCPCS | Performed by: PSYCHIATRY & NEUROLOGY

## 2023-11-30 PROCEDURE — D9220A PRA ANESTHESIA: Mod: CRNA,,, | Performed by: NURSE ANESTHETIST, CERTIFIED REGISTERED

## 2023-11-30 PROCEDURE — 22513 PERQ VERTEBRAL AUGMENTATION: CPT | Mod: ,,, | Performed by: PSYCHIATRY & NEUROLOGY

## 2023-11-30 PROCEDURE — D9220A PRA ANESTHESIA: Mod: ANES,,, | Performed by: ANESTHESIOLOGY

## 2023-11-30 PROCEDURE — 27201423 OPTIME MED/SURG SUP & DEVICES STERILE SUPPLY: Performed by: PSYCHIATRY & NEUROLOGY

## 2023-11-30 PROCEDURE — 22513 PERQ VERTEBRAL AUGMENTATION: CPT

## 2023-11-30 PROCEDURE — 85610 PROTHROMBIN TIME: CPT | Performed by: PSYCHIATRY & NEUROLOGY

## 2023-11-30 PROCEDURE — 63600175 PHARM REV CODE 636 W HCPCS: Performed by: NURSE ANESTHETIST, CERTIFIED REGISTERED

## 2023-11-30 PROCEDURE — 63600175 PHARM REV CODE 636 W HCPCS: Performed by: ANESTHESIOLOGY

## 2023-11-30 PROCEDURE — 88304 TISSUE EXAM BY PATHOLOGIST: CPT | Performed by: PSYCHIATRY & NEUROLOGY

## 2023-11-30 PROCEDURE — 25000003 PHARM REV CODE 250: Performed by: NURSE ANESTHETIST, CERTIFIED REGISTERED

## 2023-11-30 PROCEDURE — 22513 PR PERQ VERTEBRAL AUGMENTATION UNI/BILAT THORACIC: ICD-10-PCS | Mod: ,,, | Performed by: PSYCHIATRY & NEUROLOGY

## 2023-11-30 PROCEDURE — 37000009 HC ANESTHESIA EA ADD 15 MINS: Performed by: PSYCHIATRY & NEUROLOGY

## 2023-11-30 PROCEDURE — 25000003 PHARM REV CODE 250: Performed by: PSYCHIATRY & NEUROLOGY

## 2023-11-30 PROCEDURE — 22513 PERQ VERTEBRAL AUGMENTATION: CPT | Performed by: PSYCHIATRY & NEUROLOGY

## 2023-11-30 RX ORDER — HYDROMORPHONE HYDROCHLORIDE 2 MG/ML
0.4 INJECTION, SOLUTION INTRAMUSCULAR; INTRAVENOUS; SUBCUTANEOUS EVERY 5 MIN PRN
Status: DISCONTINUED | OUTPATIENT
Start: 2023-11-30 | End: 2023-12-01 | Stop reason: HOSPADM

## 2023-11-30 RX ORDER — SODIUM CHLORIDE, SODIUM LACTATE, POTASSIUM CHLORIDE, CALCIUM CHLORIDE 600; 310; 30; 20 MG/100ML; MG/100ML; MG/100ML; MG/100ML
INJECTION, SOLUTION INTRAVENOUS CONTINUOUS
Status: CANCELLED | OUTPATIENT
Start: 2023-11-30

## 2023-11-30 RX ORDER — ONDANSETRON 4 MG/1
4 TABLET, ORALLY DISINTEGRATING ORAL EVERY 6 HOURS PRN
Status: CANCELLED | OUTPATIENT
Start: 2023-11-30

## 2023-11-30 RX ORDER — CEFAZOLIN SODIUM 1 G/3ML
INJECTION, POWDER, FOR SOLUTION INTRAMUSCULAR; INTRAVENOUS
Status: DISCONTINUED | OUTPATIENT
Start: 2023-11-30 | End: 2023-11-30

## 2023-11-30 RX ORDER — SODIUM CITRATE AND CITRIC ACID MONOHYDRATE 334; 500 MG/5ML; MG/5ML
30 SOLUTION ORAL
Status: CANCELLED | OUTPATIENT
Start: 2023-11-30

## 2023-11-30 RX ORDER — MEPERIDINE HYDROCHLORIDE 25 MG/ML
12.5 INJECTION INTRAMUSCULAR; INTRAVENOUS; SUBCUTANEOUS ONCE AS NEEDED
Status: DISCONTINUED | OUTPATIENT
Start: 2023-11-30 | End: 2023-12-01 | Stop reason: HOSPADM

## 2023-11-30 RX ORDER — PROPOFOL 10 MG/ML
VIAL (ML) INTRAVENOUS
Status: DISCONTINUED | OUTPATIENT
Start: 2023-11-30 | End: 2023-11-30

## 2023-11-30 RX ORDER — HYDRALAZINE HYDROCHLORIDE 20 MG/ML
10 INJECTION INTRAMUSCULAR; INTRAVENOUS ONCE
Status: DISCONTINUED | OUTPATIENT
Start: 2023-11-30 | End: 2023-12-01 | Stop reason: HOSPADM

## 2023-11-30 RX ORDER — MIDAZOLAM HYDROCHLORIDE 1 MG/ML
2 INJECTION INTRAMUSCULAR; INTRAVENOUS ONCE AS NEEDED
Status: CANCELLED | OUTPATIENT
Start: 2023-11-30 | End: 2035-04-28

## 2023-11-30 RX ORDER — ACETAMINOPHEN 10 MG/ML
1000 INJECTION, SOLUTION INTRAVENOUS ONCE
Status: DISCONTINUED | OUTPATIENT
Start: 2023-11-30 | End: 2023-12-01 | Stop reason: HOSPADM

## 2023-11-30 RX ORDER — LIDOCAINE HYDROCHLORIDE 20 MG/ML
INJECTION INTRAVENOUS
Status: DISCONTINUED | OUTPATIENT
Start: 2023-11-30 | End: 2023-11-30

## 2023-11-30 RX ORDER — HYDROCODONE BITARTRATE AND ACETAMINOPHEN 10; 325 MG/1; MG/1
1 TABLET ORAL EVERY 6 HOURS PRN
Qty: 16 TABLET | Refills: 0 | Status: SHIPPED | OUTPATIENT
Start: 2023-11-30 | End: 2024-01-10 | Stop reason: ALTCHOICE

## 2023-11-30 RX ORDER — ROCURONIUM BROMIDE 10 MG/ML
INJECTION, SOLUTION INTRAVENOUS
Status: DISCONTINUED | OUTPATIENT
Start: 2023-11-30 | End: 2023-11-30

## 2023-11-30 RX ORDER — LIDOCAINE HYDROCHLORIDE 10 MG/ML
1 INJECTION, SOLUTION EPIDURAL; INFILTRATION; INTRACAUDAL; PERINEURAL ONCE
Status: CANCELLED | OUTPATIENT
Start: 2023-11-30 | End: 2023-11-30

## 2023-11-30 RX ORDER — LIDOCAINE HYDROCHLORIDE 20 MG/ML
INJECTION, SOLUTION INFILTRATION; PERINEURAL
Status: COMPLETED | OUTPATIENT
Start: 2023-11-30 | End: 2023-11-30

## 2023-11-30 RX ORDER — FENTANYL CITRATE 50 UG/ML
INJECTION, SOLUTION INTRAMUSCULAR; INTRAVENOUS
Status: DISCONTINUED | OUTPATIENT
Start: 2023-11-30 | End: 2023-11-30

## 2023-11-30 RX ADMIN — FENTANYL CITRATE 50 MCG: 50 INJECTION, SOLUTION INTRAMUSCULAR; INTRAVENOUS at 11:11

## 2023-11-30 RX ADMIN — CEFAZOLIN 2 G: 330 INJECTION, POWDER, FOR SOLUTION INTRAMUSCULAR; INTRAVENOUS at 11:11

## 2023-11-30 RX ADMIN — ROCURONIUM BROMIDE 50 MG: 10 SOLUTION INTRAVENOUS at 11:11

## 2023-11-30 RX ADMIN — HYDROMORPHONE HYDROCHLORIDE 0.4 MG: 2 INJECTION INTRAMUSCULAR; INTRAVENOUS; SUBCUTANEOUS at 12:11

## 2023-11-30 RX ADMIN — FENTANYL CITRATE 100 MCG: 50 INJECTION, SOLUTION INTRAMUSCULAR; INTRAVENOUS at 11:11

## 2023-11-30 RX ADMIN — LIDOCAINE HYDROCHLORIDE 5 ML: 20 INJECTION, SOLUTION INFILTRATION; PERINEURAL at 11:11

## 2023-11-30 RX ADMIN — PROPOFOL 150 MG: 10 INJECTION, EMULSION INTRAVENOUS at 11:11

## 2023-11-30 RX ADMIN — LIDOCAINE HYDROCHLORIDE 60 MG: 20 INJECTION INTRAVENOUS at 11:11

## 2023-11-30 RX ADMIN — SODIUM CHLORIDE, SODIUM GLUCONATE, SODIUM ACETATE, POTASSIUM CHLORIDE AND MAGNESIUM CHLORIDE: 526; 502; 368; 37; 30 INJECTION, SOLUTION INTRAVENOUS at 11:11

## 2023-11-30 NOTE — NURSING
The patient's daughter stated that she has not received any call from the doctor after the procedure, ELENI Logan notified and I was told Dr. Dye has left for the day. ELENI Logan called  patient's daughter but apparently she did not answer the call. Patient and daughter informed that Dr. Dye will not be speaking to them before discharge but Dr. Dye's office number is provided to patient and daughter to call should they have any questions about the procedure done today.

## 2023-11-30 NOTE — ANESTHESIA PROCEDURE NOTES
Intubation    Date/Time: 11/30/2023 11:16 AM    Performed by: Lou Gupta CRNA  Authorized by: Zhao Irving MD    Intubation:     Induction:  Intravenous    Intubated:  Postinduction    Mask Ventilation:  Easy mask    Attempts:  1    Attempted By:  CRNA    Method of Intubation:  Direct    Blade:  Spence 2    Laryngeal View Grade: Grade IIA - cords partially seen      Difficult Airway Encountered?: No      Complications:  None    Airway Device:  Oral endotracheal tube    Airway Device Size:  7.0    Style/Cuff Inflation:  Cuffed (inflated to minimal occlusive pressure)    Inflation Amount (mL):  6    Tube secured:  21    Secured at:  The lips    Placement Verified By:  Capnometry    Complicating Factors:  None    Findings Post-Intubation:  BS equal bilateral

## 2023-11-30 NOTE — DISCHARGE INSTRUCTIONS
Get lots of rest. Stay in bed for at least 24 hours.  Be sure to take all your drugs as ordered by your doctor.  You may have to limit your activity. You can slowly work up to your normal activities. .  Ask your doctor about proper posture and how to keep a healthy back. An exercise program may be advised such as gentle stretching, conditioning, and strengthening.  Place an ice pack or a bag of frozen peas wrapped in a towel over the painful part. Never put ice right on the skin. Do not leave the ice on more than 10 to 15 minutes at a time.  Do not take a soaking bath for 5 days. Can take a shower after 24 hours  No heavy lifting is allowed for 6 weeks after the procedure.  You may have hard stools due to a regular use of drugs for pain. Drink lots of fluids. Eat high-fiber foods, such as fresh fruits.

## 2023-11-30 NOTE — ANESTHESIA PREPROCEDURE EVALUATION
"                                                                                                             11/30/2023  Mirela Andraed is a 68 y.o., female with cad, pvd, and other medical problems listed in the EMR      Pre-op Assessment    I have reviewed the Patient Summary Reports.     I have reviewed the Nursing Notes. I have reviewed the NPO Status.   I have reviewed the Medications.     Review of Systems  Cardiovascular:  Exercise tolerance: good                                               Physical Exam  General: Well nourished and Cooperative    Airway:  Mallampati: II   Mouth Opening: Normal  TM Distance: Normal  Tongue: Normal  Neck ROM: Normal ROM    Dental:  Intact    Chest/Lungs:  Clear to auscultation    Heart:  Rhythm: Regular Rhythm        Anesthesia Plan  Type of Anesthesia, risks & benefits discussed:    Anesthesia Type: Gen ETT  Intra-op Monitoring Plan: Standard ASA Monitors  Post Op Pain Control Plan: multimodal analgesia  Induction:  IV  Informed Consent: Informed consent signed with the Patient and all parties understand the risks and agree with anesthesia plan.  All questions answered.   ASA Score: 2  Day of Surgery Review of History & Physical: H&P Update referred to the surgeon/provider.    Ready For Surgery From Anesthesia Perspective.     .  I explained anesthesia plan to patient/responsbile party if available.  Anesthesia consent done going over the material facts, risks, complications & alternatives, obtained which includes the possibility of altering the anesthesia plan.  I reviewed problem list, prior to admission medication list, appropriate labs, any workup, Xray, EKG etc noted below.  Patients condition is satisfactory to proceed with anesthesia plan unless otherwise noted (see anesthesia chart for details of the anesthesia plan carried out).      Pre-operative evaluation for * No procedures listed *    BP (!) 151/89   Pulse 89   Ht 5' 1" (1.549 m)   Wt 67.4 kg (148 lb " 9.4 oz)   LMP  (LMP Unknown)   SpO2 98%   BMI 28.08 kg/m²     There is no problem list on file for this patient.      Review of patient's allergies indicates:   Allergen Reactions    Clopidogrel Other (See Comments)     Other reaction(s): protruding vessels, unknown    Penicillin      Other reaction(s): rash, hives    Ibuprofen Nausea Only and Other (See Comments)    Penicillins Other (See Comments) and Rash       Current Outpatient Medications   Medication Instructions    ALPRAZolam (XANAX) 1 mg, Oral, 2 times daily PRN    diclofenac (VOLTAREN) 75 mg, Oral, 2 times daily    ezetimibe (ZETIA) 10 mg, Oral, Daily    HYDROcodone-acetaminophen  mg Tab Every 6 hours PRN    rosuvastatin (CRESTOR) 20 MG tablet Daily    valsartan-hydrochlorothiazide (DIOVAN-HCT) 320-12.5 mg per tablet 1 tablet, Oral, Daily       Past Surgical History:   Procedure Laterality Date    ANKLE SURGERY Left     HYSTERECTOMY      LUMBAR SPINE SURGERY      TONSILLECTOMY         Social History     Socioeconomic History    Marital status:    Tobacco Use    Smoking status: Every Day     Types: Cigarettes    Smokeless tobacco: Never   Substance and Sexual Activity    Alcohol use: Not Currently    Drug use: Never    Sexual activity: Not Currently       Lab Results   Component Value Date    WBC 7.20 11/30/2023    HGB 15.1 11/30/2023    HCT 45.8 11/30/2023    .9 (H) 11/30/2023     11/30/2023          BMP  Lab Results   Component Value Date    HCT 45.8 11/30/2023     11/30/2023    K 4.5 11/30/2023    BUN 17.8 11/30/2023    CREATININE 0.75 11/30/2023    CALCIUM 9.6 11/30/2023        INR  Recent Labs     11/30/23  0828   INR 0.9   PROTIME 12.1*           Diagnostic Studies:      EKG:  Results for orders placed or performed during the hospital encounter of 10/22/23   EKG 12-lead    Collection Time: 10/22/23 10:57 AM    Narrative    Test Reason : R06.02,    Vent. Rate : 070 BPM     Atrial Rate : 070 BPM     P-R Int : 114 ms           QRS Dur : 074 ms      QT Int : 402 ms       P-R-T Axes : 034 -01 003 degrees     QTc Int : 434 ms    Sinus rhythm with Premature supraventricular complexes  Baseline/motion artifact  Borderline Abnormal ECG  No previous ECGs available  Confirmed by Catrachito Leija MD ([id number]) on 10/22/2023 6:07:44 PM    Referred By:             Confirmed By:Catrachito Leija MD

## 2023-11-30 NOTE — DISCHARGE SUMMARY
[facility] - Interventional Radiology  Discharge Note  Short Stay    IR Kyphoplasty Thoracic First Vertebral      OUTCOME: Patient tolerated treatment/procedure well without complication and is now ready for discharge.    DISPOSITION: Home or Self Care    FINAL DIAGNOSIS:  <principal problem not specified>    FOLLOWUP: In clinic    DISCHARGE INSTRUCTIONS:  No discharge procedures on file.     TIME SPENT ON DISCHARGE: 31 minutes

## 2023-11-30 NOTE — H&P
Pre-Operative History and Physical   Interventional Neurology    Mirela Andrade is a 68 y.o. female here for kyphoplasty.    ROS:  Review of Systems   All other systems reviewed and are negative.       Past Medical History:   Diagnosis Date    Bipolar disorder, unspecified     Bulging of thoracic intervertebral disc     Closed fracture of one rib of right side     Compression fracture of T11 vertebra     Hepatic steatosis     HLD (hyperlipidemia)     HTN (hypertension)     Insomnia     Occlusion and stenosis of bilateral carotid arteries     Other idiopathic peripheral autonomic neuropathy     RLS (restless legs syndrome)      Past Surgical History:   Procedure Laterality Date    ANKLE SURGERY Left     HYSTERECTOMY      LUMBAR SPINE SURGERY      TONSILLECTOMY       History reviewed. No pertinent family history.  Review of patient's allergies indicates:   Allergen Reactions    Clopidogrel Other (See Comments)     Other reaction(s): protruding vessels, unknown    Penicillin      Other reaction(s): rash, hives    Ibuprofen Nausea Only and Other (See Comments)    Penicillins Other (See Comments) and Rash       Current Outpatient Medications:     ALPRAZolam (XANAX) 1 MG tablet, Take 1 mg by mouth 2 (two) times daily as needed., Disp: , Rfl:     diclofenac (VOLTAREN) 75 MG EC tablet, Take 75 mg by mouth 2 (two) times daily., Disp: , Rfl:     HYDROcodone-acetaminophen  mg Tab, every 6 (six) hours as needed., Disp: , Rfl:     valsartan-hydrochlorothiazide (DIOVAN-HCT) 320-12.5 mg per tablet, Take 1 tablet by mouth once daily., Disp: , Rfl:     ezetimibe (ZETIA) 10 mg tablet, Take 10 mg by mouth Daily., Disp: , Rfl:     rosuvastatin (CRESTOR) 20 MG tablet, Daily., Disp: , Rfl:   Outpatient Medications Marked as Taking for the 11/30/23 encounter (Hospital Encounter) with [facility]   Medication Sig Dispense Refill    ALPRAZolam (XANAX) 1 MG tablet Take 1 mg by mouth 2 (two) times daily as needed.      diclofenac  (VOLTAREN) 75 MG EC tablet Take 75 mg by mouth 2 (two) times daily.      HYDROcodone-acetaminophen  mg Tab every 6 (six) hours as needed.      valsartan-hydrochlorothiazide (DIOVAN-HCT) 320-12.5 mg per tablet Take 1 tablet by mouth once daily.       Social History     Tobacco Use    Smoking status: Every Day     Types: Cigarettes    Smokeless tobacco: Never   Substance Use Topics    Alcohol use: Not Currently    Drug use: Never         Vitals:    11/30/23 0818   BP: (!) 151/89   Pulse: 89     Gen NAD  HEENT NC/AT  CV RRR, no carotid bruits  Resp clear  GI soft  Ext no C/C/E  Neuro  AAOx4  Speech fluent, appropriate  EOMI, PERRLA, VFF  Normal facial strength, sensation  Tongue and palate midline  Motor 5/5  Sensation intact  DTRs symmetric  Coord intact          Assessment: T8 VCF    Plan: Kyphoplasty    I have discussed the risks, benefits, indications, and alternatives of the procedure in detail.  The patient verbalizes her understanding.  All questions answered.  Consents signed.  The patient agrees to proceed to proceed as planned.

## 2023-11-30 NOTE — SEDATION DOCUMENTATION
Access into T9 left vertebral body; specimen obtained for biopsy; balloon inflated to 300psi; 2ml cement injected.

## 2023-11-30 NOTE — ANESTHESIA POSTPROCEDURE EVALUATION
Anesthesia Post Evaluation    Patient: Mirela Andrade    Procedure(s) Performed: * No procedures listed *    Final Anesthesia Type: general (/Regional//MAC)      Patient location during evaluation: PACU  Post-procedure mental status: @ basline.  Pain management: adequate    PONV status: See postop meds for drugs used to control n/v if any.  Anesthetic complications: no      Cardiovascular status: blood pressure returned to baseline  Respiratory status: @ baseline.  Hydration status: euvolemic                Vitals Value Taken Time   /90 11/30/23 1315   Temp 98 11/30/23 1327   Pulse 72 11/30/23 1326   Resp 10 11/30/23 1306   SpO2 97 % 11/30/23 1326   Vitals shown include unvalidated device data.      Event Time   Out of Recovery 13:07:00         Pain/Candy Score: Pain Rating Prior to Med Admin: 5 (11/30/2023 12:38 PM)  Candy Score: 9 (11/30/2023  1:07 PM)

## 2023-11-30 NOTE — TRANSFER OF CARE
"Anesthesia Transfer of Care Note    Patient: Mirela Andrade    Procedure(s) Performed: * No procedures listed *    Patient location: PACU    Anesthesia Type: general    Transport from OR: Transported from OR on room air with adequate spontaneous ventilation    Post pain: adequate analgesia    Post assessment: no apparent anesthetic complications    Post vital signs: stable    Level of consciousness: awake, alert and oriented    Nausea/Vomiting: no nausea/vomiting    Complications: none    Transfer of care protocol was followedComments: Detailed report with handoff to licensed provider complete      Last vitals: Visit Vitals  BP (!) 151/89   Pulse 89   Ht 5' 1" (1.549 m)   Wt 67.4 kg (148 lb 9.4 oz)   LMP  (LMP Unknown)   SpO2 98%   BMI 28.08 kg/m²     "

## 2024-01-10 ENCOUNTER — OFFICE VISIT (OUTPATIENT)
Dept: NEUROLOGY | Facility: CLINIC | Age: 69
End: 2024-01-10
Payer: MEDICARE

## 2024-01-10 VITALS
BODY MASS INDEX: 27.94 KG/M2 | DIASTOLIC BLOOD PRESSURE: 82 MMHG | HEIGHT: 61 IN | HEART RATE: 79 BPM | SYSTOLIC BLOOD PRESSURE: 139 MMHG | WEIGHT: 148 LBS

## 2024-01-10 DIAGNOSIS — S22.060D CLOSED WEDGE COMPRESSION FRACTURE OF T8 VERTEBRA WITH ROUTINE HEALING: ICD-10-CM

## 2024-01-10 DIAGNOSIS — Z98.890 S/P KYPHOPLASTY: Primary | ICD-10-CM

## 2024-01-10 PROCEDURE — 99999 PR PBB SHADOW E&M-EST. PATIENT-LVL III: CPT | Mod: PBBFAC,,, | Performed by: NURSE PRACTITIONER

## 2024-01-10 PROCEDURE — 99213 OFFICE O/P EST LOW 20 MIN: CPT | Mod: PBBFAC | Performed by: NURSE PRACTITIONER

## 2024-01-10 PROCEDURE — 99213 OFFICE O/P EST LOW 20 MIN: CPT | Mod: S$PBB,,, | Performed by: NURSE PRACTITIONER

## 2024-01-10 NOTE — PROGRESS NOTES
Subjective:      Patient ID: Mirela Andrade is a 68 y.o. female.    Chief Complaint:  Back Pain (F/u Kyphoplasty. Patient denies any pain currently. States 50% relief since Kypho )      History of Present Illness  Patient presents for follow up after kyphoplasty of T9 performed on 11/30/23. Patient denies any pain today. Reports she feels as if she has had 50% relief in pain following procedure. Patient reports pain is mostly related to her previous back surgery. Patient was seeing pain management in [place] but has yet to be referred to pain management by her PCP. Patient reports surgeries with Dr. Zhao Stoll in [place] but has been unable to see him due to needing an updated bone density report from her PCP. Patient reports that she has terrible sciatic pain that began after a pin was removed following back surgery. Reports her leg will start shaking if she is standing too long.           DATE OF SURGERY:    11/30/23     SURGEON:  Payam Dye MD     PREOPERATIVE DIAGNOSIS:  T9 compression fracture.     POSTOPERATIVE DIAGNOSIS:  LT9 compression fracture.     PROCEDURES PERFORMED:    T9 kyphoplasty.  Bone biopsy at T9.     EQUIPMENT USED:  [employer] kyphoplasty tray.     DESCRIPTION OF PROCEDURE:  Following informed consent, and with the patient under general endotracheal anesthesia, they were prepped and draped in the usual sterile fashion.  Fluoroscopic evaluation demonstrated that the fracture was located at T9 instead of T8.  An incision was made over the left pedicle.  A trocar was advanced via a left transpedicular approach to the posterior third of the vertebral body.  Bone biopsy was performed.  A drill was inserted and advanced to the anterior third of the vertebral body.  This was removed.  A balloon was then inserted and inflated to a maximum pressure of 300 psi.  This was deflated and removed.  Bone cement was prepared and 2 mL were placed.  The cannula was carefully removed.  Final  fluoroscopic AP and lateral views demonstrated good cement filling.  The patient tolerated the procedure well without apparent complication.        ______________________________  Payam Dye MD         Past Medical History:   Diagnosis Date    Bipolar disorder, unspecified     Bulging of thoracic intervertebral disc     Closed fracture of one rib of right side     Compression fracture of T11 vertebra     Hepatic steatosis     HLD (hyperlipidemia)     HTN (hypertension)     Insomnia     Occlusion and stenosis of bilateral carotid arteries     Other idiopathic peripheral autonomic neuropathy     RLS (restless legs syndrome)        Past Surgical History:   Procedure Laterality Date    ANKLE SURGERY Left     HYSTERECTOMY      LUMBAR SPINE SURGERY      TONSILLECTOMY         History reviewed. No pertinent family history.    Social History     Socioeconomic History    Marital status:    Tobacco Use    Smoking status: Every Day     Types: Cigarettes    Smokeless tobacco: Never   Substance and Sexual Activity    Alcohol use: Not Currently    Drug use: Never    Sexual activity: Not Currently       Current Outpatient Medications   Medication Sig Dispense Refill    ALPRAZolam (XANAX) 1 MG tablet Take 1 mg by mouth 2 (two) times daily as needed.      diclofenac (VOLTAREN) 75 MG EC tablet Take 75 mg by mouth 2 (two) times daily.      ezetimibe (ZETIA) 10 mg tablet Take 10 mg by mouth Daily.      rosuvastatin (CRESTOR) 20 MG tablet Daily.      valsartan-hydrochlorothiazide (DIOVAN-HCT) 320-12.5 mg per tablet Take 1 tablet by mouth once daily.       No current facility-administered medications for this visit.       Review of patient's allergies indicates:   Allergen Reactions    Clopidogrel Other (See Comments)     Other reaction(s): protruding vessels, unknown    Penicillin      Other reaction(s): rash, hives    Ibuprofen Nausea Only and Other (See Comments)    Penicillins Other (See Comments) and Rash        Vitals:  "   01/10/24 0906   BP: 139/82   BP Location: Left arm   Patient Position: Sitting   Pulse: 79   Weight: 67.1 kg (148 lb)   Height: 5' 1" (1.549 m)        Review of Systems  12 point ROS performed and negative unless otherwise documented in HPI  Objective:     Neurologic Exam     Mental Status   Oriented to person, place, and time.   Speech: speech is normal   Level of consciousness: alert    Cranial Nerves   Cranial nerves II through XII intact.     Motor Exam   Muscle bulk: normal  Right arm tone: normal  Left arm tone: normal  Right leg tone: normal  Left leg tone: normal    Strength   Strength 5/5 throughout.     Sensory Exam   Light touch normal.     Gait, Coordination, and Reflexes     Gait  Gait: normal      Physical Exam  Vitals reviewed.   Pulmonary:      Effort: Pulmonary effort is normal.   Neurological:      Mental Status: She is oriented to person, place, and time.      Cranial Nerves: Cranial nerves 2-12 are intact.      Motor: Motor strength is normal.     Gait: Gait is intact.   Psychiatric:         Speech: Speech normal.          Assessment:     1. S/P kyphoplasty    2. Closed wedge compression fracture of T8 vertebra with routine healing        Plan:      Advised patient Dr. Dye does not prescribe opioids for pain management  Recommend patient follow up with her spine surgeon for further eval and treatment  Recommend patient get referral from her PCP to pain management due to prior use of pain management in a different state  Patient to follow up prn/if any further fractures occur        "

## 2024-01-11 LAB — PSYCHE PATHOLOGY RESULT: NORMAL

## 2025-02-05 ENCOUNTER — HOSPITAL ENCOUNTER (EMERGENCY)
Facility: HOSPITAL | Age: 70
Discharge: HOME | End: 2025-02-05
Attending: EMERGENCY MEDICINE
Payer: MEDICARE

## 2025-02-05 VITALS
TEMPERATURE: 98 F | OXYGEN SATURATION: 95 % | BODY MASS INDEX: 27.38 KG/M2 | WEIGHT: 145 LBS | DIASTOLIC BLOOD PRESSURE: 86 MMHG | HEART RATE: 87 BPM | HEIGHT: 61 IN | SYSTOLIC BLOOD PRESSURE: 148 MMHG | RESPIRATION RATE: 18 BRPM

## 2025-02-05 DIAGNOSIS — M79.641 RIGHT HAND PAIN: Primary | ICD-10-CM

## 2025-02-05 DIAGNOSIS — M19.041 ARTHRITIS OF RIGHT HAND: ICD-10-CM

## 2025-02-05 DIAGNOSIS — M25.531 RIGHT WRIST PAIN: ICD-10-CM

## 2025-02-05 DIAGNOSIS — G56.01 CARPAL TUNNEL SYNDROME OF RIGHT WRIST: ICD-10-CM

## 2025-02-05 PROCEDURE — 99284 EMERGENCY DEPT VISIT MOD MDM: CPT

## 2025-02-05 PROCEDURE — 25000003 PHARM REV CODE 250: Performed by: PHYSICIAN ASSISTANT

## 2025-02-05 PROCEDURE — 63600175 PHARM REV CODE 636 W HCPCS: Performed by: PHYSICIAN ASSISTANT

## 2025-02-05 RX ORDER — PREDNISONE 20 MG/1
40 TABLET ORAL
Status: COMPLETED | OUTPATIENT
Start: 2025-02-05 | End: 2025-02-05

## 2025-02-05 RX ORDER — HYDROCODONE BITARTRATE AND ACETAMINOPHEN 5; 325 MG/1; MG/1
1 TABLET ORAL
Status: COMPLETED | OUTPATIENT
Start: 2025-02-05 | End: 2025-02-05

## 2025-02-05 RX ORDER — METHOCARBAMOL 500 MG/1
1000 TABLET, FILM COATED ORAL
Status: COMPLETED | OUTPATIENT
Start: 2025-02-05 | End: 2025-02-05

## 2025-02-05 RX ORDER — HYDROCODONE BITARTRATE AND ACETAMINOPHEN 5; 325 MG/1; MG/1
1 TABLET ORAL EVERY 6 HOURS PRN
Qty: 12 TABLET | Refills: 0 | Status: SHIPPED | OUTPATIENT
Start: 2025-02-05

## 2025-02-05 RX ORDER — PREDNISONE 20 MG/1
30 TABLET ORAL DAILY
Qty: 5 TABLET | Refills: 0 | Status: SHIPPED | OUTPATIENT
Start: 2025-02-05 | End: 2025-02-08

## 2025-02-05 RX ORDER — CYCLOBENZAPRINE HCL 5 MG
5 TABLET ORAL 3 TIMES DAILY PRN
Qty: 30 TABLET | Refills: 0 | Status: SHIPPED | OUTPATIENT
Start: 2025-02-05 | End: 2025-02-15

## 2025-02-05 RX ADMIN — HYDROCODONE BITARTRATE AND ACETAMINOPHEN 1 TABLET: 5; 325 TABLET ORAL at 07:02

## 2025-02-05 RX ADMIN — METHOCARBAMOL 1000 MG: 500 TABLET ORAL at 07:02

## 2025-02-05 RX ADMIN — PREDNISONE 40 MG: 20 TABLET ORAL at 07:02

## 2025-02-06 NOTE — ED PROVIDER NOTES
Encounter Date: 2/5/2025       History     Chief Complaint   Patient presents with    Wrist Injury     Pt woke up Monday morning unable to open right hand. Pt denies getting previously seen. Pt denies taking medicaiton prior to arrival to er. Pt denies trauma to wrist. States pain is 10/10     See MDM for details.      The history is provided by the patient. No  was used.     Review of patient's allergies indicates:   Allergen Reactions    Clopidogrel Other (See Comments)     Other reaction(s): protruding vessels, unknown    Penicillin      Other reaction(s): rash, hives    Ibuprofen Nausea Only and Other (See Comments)    Penicillins Other (See Comments) and Rash     Past Medical History:   Diagnosis Date    Bipolar disorder, unspecified     Bulging of thoracic intervertebral disc     Closed fracture of one rib of right side     Compression fracture of T11 vertebra     Hepatic steatosis     HLD (hyperlipidemia)     HTN (hypertension)     Insomnia     Occlusion and stenosis of bilateral carotid arteries     Other idiopathic peripheral autonomic neuropathy     RLS (restless legs syndrome)      Past Surgical History:   Procedure Laterality Date    ANKLE SURGERY Left     HYSTERECTOMY      LUMBAR SPINE SURGERY      TONSILLECTOMY       No family history on file.  Social History     Tobacco Use    Smoking status: Every Day     Types: Cigarettes    Smokeless tobacco: Never   Substance Use Topics    Alcohol use: Not Currently     Comment: socially    Drug use: Never     Review of Systems   Constitutional: Negative.    HENT: Negative.     Eyes: Negative.    Respiratory: Negative.     Cardiovascular: Negative.    Gastrointestinal: Negative.    Genitourinary: Negative.    Musculoskeletal:  Positive for arthralgias and myalgias.   Neurological: Negative.        Physical Exam     Initial Vitals [02/05/25 1812]   BP Pulse Resp Temp SpO2   (!) 153/100 98 20 98.3 °F (36.8 °C) 97 %      MAP       --          Physical Exam    Nursing note and vitals reviewed.  Constitutional: She appears well-developed and well-nourished. No distress.   HENT:   Head: Normocephalic and atraumatic.   Eyes: Conjunctivae, EOM and lids are normal. Pupils are equal, round, and reactive to light.   Neck: Neck supple.   Normal range of motion.  Cardiovascular:  Normal rate.           Pulmonary/Chest: Effort normal and breath sounds normal.   Abdominal: Abdomen is flat.   Musculoskeletal:      Right hand: Swelling, tenderness and bony tenderness present. No deformity or lacerations. Decreased range of motion. Normal strength. Normal sensation.      Left hand: Normal. No swelling, deformity, lacerations or bony tenderness. Normal range of motion. Normal strength. Normal sensation.        Arms:       Cervical back: Normal range of motion and neck supple.      Comments: +tinels right wrist. Tenderness throughout the hand. No specific tenderness noted.      Neurological: She is alert and oriented to person, place, and time. She has normal strength. She is not disoriented. No cranial nerve deficit or sensory deficit. GCS eye subscore is 4. GCS verbal subscore is 5. GCS motor subscore is 6.   Skin: Skin is warm and intact.   Psychiatric: She has a normal mood and affect. Her speech is normal and behavior is normal. Judgment and thought content normal. Cognition and memory are normal.         ED Course   Procedures  Labs Reviewed - No data to display       Imaging Results    None          Medications   HYDROcodone-acetaminophen 5-325 mg per tablet 1 tablet (1 tablet Oral Given 2/5/25 1907)   methocarbamoL tablet 1,000 mg (1,000 mg Oral Given 2/5/25 1907)   predniSONE tablet 40 mg (40 mg Oral Given 2/5/25 1920)     Medical Decision Making  [id number] w/hx of arthritis, HTN, HLD, and BPD presents to the emergency department with atraumatic right wrist pain for the last 4 days.  Patient states it started cramping in the middle of the night.  Denies any  known injury to the area.  States he has a history of carpal tunnel in that hand but has not been wearing her brace.  Patient has not taken anything for pain.  Patient went to urgent care prior to this and was told to come to the ER.    Problems Addressed:  Carpal tunnel syndrome of right wrist: chronic illness or injury with exacerbation, progression, or side effects of treatment  Right hand pain: chronic illness or injury  Right wrist pain: acute illness or injury     Details: Differential diagnosis included but not limited to:  Risk factor, restrained, carpal tunnel syndrome, arthritis     I viewed the images from an outside facility but do not show any acute findings.  Notable chronic changes and arthritic changes. Dr. Phillips agrees with plan.  Wrist brace was placed patient given medication.  Patient states she already feels better.  Exam improve.  Patient will follow up with primary care.  Likely severe arthritis.  Oral steroids given this patient was given Decadron injection less than a month ago.  All questions asked and answered at the time of the visit. Strict ER precautions given. Patient and family members agreeable to plan.       Amount and/or Complexity of Data Reviewed  External Data Reviewed: radiology.     Details: [email or web address]    [email or web address]    Risk  Prescription drug management.                                      Clinical Impression:  Final diagnoses:  [M79.641] Right hand pain (Primary)  [M25.531] Right wrist pain  [G56.01] Carpal tunnel syndrome of right wrist  [M19.041] Arthritis of right hand          ED Disposition Condition    Discharge Stable          ED Prescriptions       Medication Sig Dispense Start Date End Date Auth. Provider    HYDROcodone-acetaminophen (NORCO) 5-325 mg per tablet Take 1 tablet by mouth every 6 (six) hours as needed for Pain. 12 tablet 2/5/2025 -- Phylicia Draper  PA    cyclobenzaprine (FLEXERIL) 5 MG tablet Take 1 tablet (5 mg total) by mouth 3 (three) times daily as needed for Muscle spasms. Never take before driving or operating heavy machinery 30 tablet 2/5/2025 2/15/2025 Phylicia Draper PA    predniSONE (DELTASONE) 20 MG tablet Take 1.5 tablets (30 mg total) by mouth once daily. Start tomorrow for 3 days 5 tablet 2/5/2025 2/8/2025 Phylicia Draper PA          Follow-up Information       Follow up With Specialties Details Why Contact Info    Luca Marley MD Family Medicine Call   [address] [facility] [address] [phone]      [facility] - Emergency Dept Emergency Medicine  As needed, If symptoms worsen [id number]  [facility] [id number] [phone]        This note was typed partially using voice recognition software.  Please be reminded that not all corrections/addendums to grammar may have been made prior to closing of this chart.       Phylicia Draper PA  02/05/25 2001

## 2025-05-04 ENCOUNTER — HOSPITAL ENCOUNTER (EMERGENCY)
Facility: HOSPITAL | Age: 70
Discharge: HOME | End: 2025-05-04
Attending: EMERGENCY MEDICINE
Payer: MEDICARE

## 2025-05-04 VITALS
DIASTOLIC BLOOD PRESSURE: 90 MMHG | OXYGEN SATURATION: 98 % | TEMPERATURE: 99 F | HEIGHT: 62 IN | WEIGHT: 145 LBS | SYSTOLIC BLOOD PRESSURE: 126 MMHG | BODY MASS INDEX: 26.68 KG/M2 | RESPIRATION RATE: 18 BRPM | HEART RATE: 92 BPM

## 2025-05-04 DIAGNOSIS — G89.29 CHRONIC BILATERAL LOW BACK PAIN, UNSPECIFIED WHETHER SCIATICA PRESENT: Primary | ICD-10-CM

## 2025-05-04 DIAGNOSIS — M54.50 CHRONIC BILATERAL LOW BACK PAIN, UNSPECIFIED WHETHER SCIATICA PRESENT: Primary | ICD-10-CM

## 2025-05-04 PROCEDURE — 25000003 PHARM REV CODE 250: Performed by: NURSE PRACTITIONER

## 2025-05-04 PROCEDURE — 63600175 PHARM REV CODE 636 W HCPCS: Performed by: NURSE PRACTITIONER

## 2025-05-04 PROCEDURE — 99284 EMERGENCY DEPT VISIT MOD MDM: CPT | Mod: 25

## 2025-05-04 PROCEDURE — 96372 THER/PROPH/DIAG INJ SC/IM: CPT | Performed by: NURSE PRACTITIONER

## 2025-05-04 RX ORDER — HYDROCODONE BITARTRATE AND ACETAMINOPHEN 10; 325 MG/1; MG/1
1 TABLET ORAL
Refills: 0 | Status: COMPLETED | OUTPATIENT
Start: 2025-05-04 | End: 2025-05-04

## 2025-05-04 RX ORDER — CYCLOBENZAPRINE HCL 5 MG
5 TABLET ORAL 3 TIMES DAILY PRN
Qty: 15 TABLET | Refills: 0 | Status: SHIPPED | OUTPATIENT
Start: 2025-05-04

## 2025-05-04 RX ORDER — HYDROCODONE BITARTRATE AND ACETAMINOPHEN 7.5; 325 MG/1; MG/1
1 TABLET ORAL EVERY 6 HOURS PRN
Qty: 15 TABLET | Refills: 0 | Status: SHIPPED | OUTPATIENT
Start: 2025-05-04

## 2025-05-04 RX ORDER — CYCLOBENZAPRINE HCL 10 MG
10 TABLET ORAL
Status: COMPLETED | OUTPATIENT
Start: 2025-05-04 | End: 2025-05-04

## 2025-05-04 RX ORDER — DEXAMETHASONE SODIUM PHOSPHATE 4 MG/ML
8 INJECTION, SOLUTION INTRA-ARTICULAR; INTRALESIONAL; INTRAMUSCULAR; INTRAVENOUS; SOFT TISSUE
Status: COMPLETED | OUTPATIENT
Start: 2025-05-04 | End: 2025-05-04

## 2025-05-04 RX ADMIN — HYDROCODONE BITARTRATE AND ACETAMINOPHEN 1 TABLET: 10; 325 TABLET ORAL at 07:05

## 2025-05-04 RX ADMIN — DEXAMETHASONE SODIUM PHOSPHATE 8 MG: 4 INJECTION, SOLUTION INTRA-ARTICULAR; INTRALESIONAL; INTRAMUSCULAR; INTRAVENOUS; SOFT TISSUE at 07:05

## 2025-05-04 RX ADMIN — CYCLOBENZAPRINE 10 MG: 10 TABLET, FILM COATED ORAL at 07:05

## 2025-05-04 NOTE — ED TRIAGE NOTES
Pt complaint of back pain for a week that is not improving and denies recent injury relating that she has hx of past surgeries

## 2025-05-05 NOTE — ED PROVIDER NOTES
Encounter Date: 5/4/2025       History     Chief Complaint   Patient presents with    Back Pain     Pt complaint of back pain for a week that is not improving and denies recent injury relating that she has hx of past surgeries     Patient states bilateral lower back pain times 1-2 weeks.  Denies any injury or trauma.  Patient states a history of chronic lower back pain.  Patient states that approximately 3 months ago she received injections into her back for her chronic back pain.  Patient states that she did have improvement in her back pain after the injections.  Patient denies any numbness or tingling to extremities.  Patient denies any loss of bladder or bowel or any saddle anesthesia.  Patient states that pain is intermittent and worsens with movement and certain positions.  Past medical history of bipolar disorder, chronic back pain, hypertension, hyperlipidemia, hysterectomy.    The history is provided by the patient.   Back Pain   This is a chronic problem. The current episode started several weeks ago. The problem occurs intermittently. The problem has been unchanged. The pain is associated with no known injury. The pain is present in the lumbar spine. The quality of the pain is described as aching. The pain does not radiate. The pain is at a severity of 9/10. The symptoms are aggravated by certain positions. The pain is The same all the time. Pertinent negatives include no chest pain, no fever, no numbness, no weight loss, no headaches, no abdominal pain, no bowel incontinence, no perianal numbness, no bladder incontinence, no dysuria, no pelvic pain, no paresthesias, no paresis, no tingling and no weakness.     Review of patient's allergies indicates:   Allergen Reactions    Clopidogrel Other (See Comments)     Other reaction(s): protruding vessels, unknown    Penicillin      Other reaction(s): rash, hives    Ibuprofen Nausea Only and Other (See Comments)    Penicillins Other (See Comments) and Rash      Past Medical History:   Diagnosis Date    Bipolar disorder, unspecified     Bulging of thoracic intervertebral disc     Closed fracture of one rib of right side     Compression fracture of T11 vertebra     Hepatic steatosis     HLD (hyperlipidemia)     HTN (hypertension)     Insomnia     Occlusion and stenosis of bilateral carotid arteries     Other idiopathic peripheral autonomic neuropathy     RLS (restless legs syndrome)      Past Surgical History:   Procedure Laterality Date    ANKLE SURGERY Left     HYSTERECTOMY      LUMBAR SPINE SURGERY      TONSILLECTOMY       No family history on file.  Social History[1]  Review of Systems   Constitutional: Negative.  Negative for fever and weight loss.   HENT: Negative.     Eyes: Negative.    Respiratory: Negative.     Cardiovascular: Negative.  Negative for chest pain.   Gastrointestinal: Negative.  Negative for abdominal pain and bowel incontinence.   Endocrine: Negative.    Genitourinary: Negative.  Negative for bladder incontinence, dysuria and pelvic pain.   Musculoskeletal:  Positive for back pain. Negative for neck pain.   Skin: Negative.    Allergic/Immunologic: Negative.    Neurological: Negative.  Negative for tingling, weakness, numbness, headaches and paresthesias.   Hematological: Negative.    Psychiatric/Behavioral: Negative.     All other systems reviewed and are negative.      Physical Exam     Initial Vitals [05/04/25 1823]   BP Pulse Resp Temp SpO2   (!) 143/89 99 20 98.6 °F (37 °C) 98 %      MAP       --         Physical Exam    Nursing note and vitals reviewed.  Constitutional: She appears well-developed and well-nourished. No distress.   HENT:   Head: Normocephalic and atraumatic. Mouth/Throat: Oropharynx is clear and moist.   Eyes: Conjunctivae and EOM are normal. Pupils are equal, round, and reactive to light.   Neck: Neck supple.   Normal range of motion.  Cardiovascular:  Normal rate, regular rhythm, normal heart sounds and intact distal pulses.            Pulmonary/Chest: Breath sounds normal. No respiratory distress. She has no wheezes.   Abdominal: Abdomen is soft. Bowel sounds are normal. She exhibits no distension. There is no abdominal tenderness.   Musculoskeletal:         General: No edema. Normal range of motion.      Cervical back: Normal range of motion and neck supple. No tenderness or bony tenderness. No pain with movement. Normal range of motion.      Thoracic back: Normal. No tenderness or bony tenderness. Normal range of motion.      Lumbar back: Tenderness (Bilateral paraspinal tenderness to palpation.) present. No bony tenderness. Normal range of motion.        Back:      Neurological: She is alert and oriented to person, place, and time. She has normal strength. Gait normal. GCS score is 15. GCS eye subscore is 4. GCS verbal subscore is 5. GCS motor subscore is 6.   Skin: Skin is warm and dry. No rash noted.   Psychiatric: She has a normal mood and affect. Thought content normal.         ED Course   Procedures  Labs Reviewed - No data to display       Imaging Results    None          Medications   dexAMETHasone injection 8 mg (8 mg Intramuscular Given 5/4/25 1945)   cyclobenzaprine tablet 10 mg (10 mg Oral Given 5/4/25 1946)   HYDROcodone-acetaminophen  mg per tablet 1 tablet (1 tablet Oral Given 5/4/25 1946)     Medical Decision Making  Patient states bilateral lower back pain times 1-2 weeks.  Denies any injury or trauma.  Patient states a history of chronic lower back pain.  Patient states that approximately 3 months ago she received injections into her back for her chronic back pain.  Patient states that she did have improvement in her back pain after the injections.  Patient denies any numbness or tingling to extremities.  Patient denies any loss of bladder or bowel or any saddle anesthesia.  Patient states that pain is intermittent and worsens with movement and certain positions.  Past medical history of bipolar disorder,  chronic back pain, hypertension, hyperlipidemia, hysterectomy.    The history is provided by the patient.   Back Pain   This is a chronic problem. The current episode started several weeks ago. The problem occurs intermittently. The problem has been unchanged. The pain is associated with no known injury. The pain is present in the lumbar spine. The quality of the pain is described as aching. The pain does not radiate. The pain is at a severity of 9/10. The symptoms are aggravated by certain positions. The pain is The same all the time. Pertinent negatives include no chest pain, no fever, no numbness, no weight loss, no headaches, no abdominal pain, no bowel incontinence, no perianal numbness, no bladder incontinence, no dysuria, no pelvic pain, no paresthesias, no paresis, no tingling and no weakness.       Amount and/or Complexity of Data Reviewed  Discussion of management or test interpretation with external provider(s): Differential diagnosis (including but not limited to):   Judging by the patient's chief complaint and pertinent history, the patient has the following possible differential diagnoses, including but not limited to the following.  Some of these are deemed to be lower likelihood and some more likely based on my physical exam and history combined with possible lab work and/or imaging studies.   Please see the pertinent studies, and refer to the HPI.  Some of these diagnoses will take further evaluation to fully rule out, perhaps as an outpatient and the patient was encouraged to follow up when discharged for more comprehensive evaluation.  Back pain, chronic back pain, lumbar strain, sciatica  Patient to the ED with complaints of chronic lower back pain.  Discussed with patient that we will provide her pain control here in the ED and discharge with pain control.  Patient was given Norco p.o. and Flexeril p.o. for pain in the ED.  Patient states that her pain was relieved.  Patient is ambulatory around  the ED without difficulty.  Instructed patient that she needs to follow up up with her PCP as she may need an outpatient MRI and/or physical therapy for further evaluation of her chronic low back pain.  Patient states understanding.  ED return precautions were given.      Risk  Prescription drug management.                                      Clinical Impression:  Final diagnoses:  [M54.50, G89.29] Chronic bilateral low back pain, unspecified whether sciatica present (Primary)          ED Disposition Condition    Discharge Stable          ED Prescriptions       Medication Sig Dispense Start Date End Date Auth. Provider    HYDROcodone-acetaminophen (NORCO) 7.5-325 mg per tablet Take 1 tablet by mouth every 6 (six) hours as needed for Pain. 15 tablet 5/4/2025 -- Yoana Hernandez FNP    cyclobenzaprine (FLEXERIL) 5 MG tablet Take 1 tablet (5 mg total) by mouth 3 (three) times daily as needed for Muscle spasms. 15 tablet 5/4/2025 -- Yoana Hernandez FNP          Follow-up Information       Follow up With Specialties Details Why Contact Info    Luca Marley MD Family Medicine In 3 days  [address] [facility] [address]  [phone]                 [1]   Social History  Tobacco Use    Smoking status: Every Day     Types: Cigarettes    Smokeless tobacco: Never   Substance Use Topics    Alcohol use: Not Currently     Comment: socially    Drug use: Never        Yoana Hernandez FNP  05/04/25 2003

## 2025-06-13 ENCOUNTER — HOSPITAL ENCOUNTER (EMERGENCY)
Facility: HOSPITAL | Age: 70
Discharge: HOME | End: 2025-06-13
Attending: EMERGENCY MEDICINE
Payer: MEDICARE

## 2025-06-13 VITALS
SYSTOLIC BLOOD PRESSURE: 148 MMHG | WEIGHT: 140 LBS | TEMPERATURE: 98 F | HEIGHT: 61 IN | OXYGEN SATURATION: 96 % | HEART RATE: 106 BPM | RESPIRATION RATE: 22 BRPM | BODY MASS INDEX: 26.43 KG/M2 | DIASTOLIC BLOOD PRESSURE: 98 MMHG

## 2025-06-13 DIAGNOSIS — M19.031 OSTEOARTHRITIS OF RIGHT WRIST, UNSPECIFIED OSTEOARTHRITIS TYPE: ICD-10-CM

## 2025-06-13 DIAGNOSIS — M79.641 PAIN OF RIGHT HAND: ICD-10-CM

## 2025-06-13 DIAGNOSIS — G56.01 CARPAL TUNNEL SYNDROME OF RIGHT WRIST: Primary | ICD-10-CM

## 2025-06-13 PROCEDURE — 96372 THER/PROPH/DIAG INJ SC/IM: CPT

## 2025-06-13 PROCEDURE — 63600175 PHARM REV CODE 636 W HCPCS

## 2025-06-13 PROCEDURE — 99284 EMERGENCY DEPT VISIT MOD MDM: CPT | Mod: 25

## 2025-06-13 RX ORDER — HYDROCODONE BITARTRATE AND ACETAMINOPHEN 5; 325 MG/1; MG/1
1 TABLET ORAL EVERY 6 HOURS PRN
Qty: 12 TABLET | Refills: 0 | Status: SHIPPED | OUTPATIENT
Start: 2025-06-13

## 2025-06-13 RX ORDER — DEXAMETHASONE SODIUM PHOSPHATE 4 MG/ML
8 INJECTION, SOLUTION INTRA-ARTICULAR; INTRALESIONAL; INTRAMUSCULAR; INTRAVENOUS; SOFT TISSUE
Status: COMPLETED | OUTPATIENT
Start: 2025-06-13 | End: 2025-06-13

## 2025-06-13 RX ORDER — METHOCARBAMOL 750 MG/1
1500 TABLET, FILM COATED ORAL 3 TIMES DAILY
Qty: 30 TABLET | Refills: 0 | Status: SHIPPED | OUTPATIENT
Start: 2025-06-13 | End: 2025-06-18

## 2025-06-13 RX ADMIN — DEXAMETHASONE SODIUM PHOSPHATE 8 MG: 4 INJECTION, SOLUTION INTRA-ARTICULAR; INTRALESIONAL; INTRAMUSCULAR; INTRAVENOUS; SOFT TISSUE at 12:06

## 2025-06-13 NOTE — ED PROVIDER NOTES
Encounter Date: 6/13/2025       History     Chief Complaint   Patient presents with    Hand Pain     Pt c/o carpal tunnel pain to right hand onset ten days ago.     See MDM    The history is provided by the patient.     Review of patient's allergies indicates:   Allergen Reactions    Clopidogrel Other (See Comments)     Other reaction(s): protruding vessels, unknown    Penicillin      Other reaction(s): rash, hives    Ibuprofen Nausea Only and Other (See Comments)    Penicillins Other (See Comments) and Rash     Past Medical History:   Diagnosis Date    Bipolar disorder, unspecified     Bulging of thoracic intervertebral disc     Carpal tunnel syndrome on right     Closed fracture of one rib of right side     Compression fracture of T11 vertebra     Hepatic steatosis     HLD (hyperlipidemia)     HTN (hypertension)     Insomnia     Occlusion and stenosis of bilateral carotid arteries     Other idiopathic peripheral autonomic neuropathy     RLS (restless legs syndrome)      Past Surgical History:   Procedure Laterality Date    ANKLE SURGERY Left     HYSTERECTOMY      LUMBAR SPINE SURGERY      right wrist Right     TONSILLECTOMY       No family history on file.  Social History[1]  Review of Systems   Constitutional:  Negative for chills and fever.   Respiratory:  Negative for chest tightness and shortness of breath.    Cardiovascular:  Negative for chest pain and palpitations.   Gastrointestinal:  Negative for abdominal pain, diarrhea and vomiting.   Genitourinary:  Negative for dysuria and frequency.   Musculoskeletal:  Positive for arthralgias. Negative for joint swelling and myalgias.   Neurological:  Negative for dizziness and syncope.   All other systems reviewed and are negative.      Physical Exam     Initial Vitals [06/13/25 1209]   BP Pulse Resp Temp SpO2   (!) 148/98 106 (!) 22 98.1 °F (36.7 °C) 96 %      MAP       --         Physical Exam    Vitals reviewed.  Constitutional: She appears well-developed and  well-nourished. No distress.   Cardiovascular:  Normal rate, regular rhythm, normal heart sounds and intact distal pulses.           Pulmonary/Chest: Breath sounds normal. No respiratory distress.   Musculoskeletal:        Hands:       Comments: Pt painful to anterior distal wrist as well as posterior. The pain moves distally into the hand. She is unable to spread fingers of right hand without pain. 2+ DP pulses bilaterally. Decreased strength I right hand. Denies numbness or tingling. No sensory deficit on exam     Neurological: She is alert and oriented to person, place, and time.   Skin: Skin is warm and dry.   Psychiatric: She has a normal mood and affect.         ED Course   Procedures  Labs Reviewed - No data to display       Imaging Results    None          Medications   dexAMETHasone injection 8 mg (8 mg Intramuscular Given 6/13/25 7318)     Medical Decision Making  69 year old female with hx of HTN, HLD, Carpal tunnel presents to ED due to right wrist/hand pain. She states her carpal tunnel has been flaring up on and off since Feb of this year. She was originally diagnosed when she worked and was using her hands a lot but now it seems to be getting worse even though she is not working anymore. She wears a splint during the day but not at night. She has been seen a few times for this issue and has been prescribed prednisone, flexeril, and norco. She states she can't take oral steroids because they bother her stomach but the flexeril and the norco are helpful for flare ups. Also mentions she used to take xanax but has stopped a couple of months ago. She has not seen orthopedic surgeon for this issue yet    Will give decadron IM and send home with pain medication and muscle relaxers and refer to orthopedic surgery.     Discussed follow up and ED return precautions. Pt verbalized understanding. All questions answered      Risk  Prescription drug management.                                      Clinical  Impression:  Final diagnoses:  [G56.01] Carpal tunnel syndrome of right wrist (Primary)  [M79.641] Pain of right hand  [M19.031] Osteoarthritis of right wrist, unspecified osteoarthritis type          ED Disposition Condition    Discharge Stable          ED Prescriptions       Medication Sig Dispense Start Date End Date Auth. Provider    methocarbamoL (ROBAXIN) 750 MG Tab Take 2 tablets (1,500 mg total) by mouth 3 (three) times daily. for 5 days 30 tablet 6/13/2025 6/18/2025 Stephanie Hansen PA-C    HYDROcodone-acetaminophen (NORCO) 5-325 mg per tablet Take 1 tablet by mouth every 6 (six) hours as needed for Pain. 12 tablet 6/13/2025 -- Stephanie Hansen PA-C          Follow-up Information       Follow up With Specialties Details Why Contact Info [facility] Call in 1 day  [address] [facility] [address] [phone]    Luca Marley MD Family Medicine Schedule an appointment as soon as possible for a visit  As needed [facility] [address] [facility] [address]  [phone]                     [1]   Social History  Tobacco Use    Smoking status: Every Day     Types: Cigarettes    Smokeless tobacco: Never   Substance Use Topics    Alcohol use: Not Currently     Comment: socially    Drug use: Never        Stephanie Hansen PA-C  06/13/25 5102

## 2025-06-13 NOTE — DISCHARGE INSTRUCTIONS
Take robaxin up to 3 times a day for pain. Take norco every 6 hours for more severe pain. Do not drink or drive while taking.    Call the number on paperwork to schedule an appointment with [facility] orthopedics.    Follow up with your primary care provider.

## 2025-06-23 ENCOUNTER — OFFICE VISIT (OUTPATIENT)
Dept: ORTHOPEDICS | Facility: CLINIC | Age: 70
End: 2025-06-23
Payer: MEDICARE

## 2025-06-23 ENCOUNTER — HOSPITAL ENCOUNTER (OUTPATIENT)
Dept: RADIOLOGY | Facility: CLINIC | Age: 70
Discharge: HOME | End: 2025-06-23
Attending: ORTHOPAEDIC SURGERY
Payer: MEDICARE

## 2025-06-23 VITALS
HEART RATE: 87 BPM | SYSTOLIC BLOOD PRESSURE: 117 MMHG | BODY MASS INDEX: 26.43 KG/M2 | HEIGHT: 61 IN | DIASTOLIC BLOOD PRESSURE: 80 MMHG | WEIGHT: 140 LBS

## 2025-06-23 DIAGNOSIS — M19.031 LOCALIZED PRIMARY OSTEOARTHRITIS OF RIGHT WRIST: ICD-10-CM

## 2025-06-23 DIAGNOSIS — M25.539 PAIN IN WRIST, UNSPECIFIED LATERALITY: ICD-10-CM

## 2025-06-23 DIAGNOSIS — M25.539 PAIN IN WRIST, UNSPECIFIED LATERALITY: Primary | ICD-10-CM

## 2025-06-23 DIAGNOSIS — G56.01 CARPAL TUNNEL SYNDROME OF RIGHT WRIST: ICD-10-CM

## 2025-06-23 PROCEDURE — 99203 OFFICE O/P NEW LOW 30 MIN: CPT | Mod: ,,, | Performed by: ORTHOPAEDIC SURGERY

## 2025-06-23 PROCEDURE — 73110 X-RAY EXAM OF WRIST: CPT | Mod: RT,,, | Performed by: ORTHOPAEDIC SURGERY

## 2025-06-23 NOTE — PROGRESS NOTES
"Subjective:    CC: Pain of the Right Wrist (R wrist pain - pt states that she has recurrent carpal tunnel. She has numbness when she writes her name. She has not had any treatment for her wrist. Pain spells are getting longer and longer. )       HPI:  1st visit.  Patient complains of pain swelling stiffness numbness in her right hand for many years.  Patient states over 15 years ago he was told she had carpal tunnel.  She has been using medication, splinting, gradually getting worse, not improving with conservative treatment.  She denies any new trauma or specific injury, she states she has had a wrist surgery in the past, denies any other complaints.    ROS: Refer to HPI for pertinent ROS. All other 12 point systems negative.    Objective:  Vitals:    06/23/25 1107   BP: 117/80   Pulse: 87   Weight: 63.5 kg (139 lb 15.9 oz)   Height: 5' 1" (1.549 m)        Physical Exam:  Patient is well-nourished developed female she is awake alert and oriented x3 she is in no apparent distress he is pleasant and cooperative.  Examination of the right hand does have some tenderness along the PIP D IP joints, she does have stiffness with flexion extension of her fingers.  Positive Tinel and Phalen's.  Questionable thenar and hypothenar wasting, neurovascular intact distally.    Images:  X-rays three views right hand demonstrates degenerative changes. Images Reviewed and discussed with patient.    Assessment:  1. Pain in wrist, unspecified laterality  - X-Ray Wrist Complete Right; Future    2. Carpal tunnel syndrome of right wrist  - Ambulatory referral/consult to Orthopedics    3. Localized primary osteoarthritis of right wrist        Plan:  This time we discussed her physical exam and x-ray findings.  Patient remains be symptomatic, we will proceed with a nerve conduction study of the right upper extremity, I would like see back in 2 weeks for with her results.    Follow UP: No follow-ups on file.              "

## 2025-06-24 DIAGNOSIS — G56.01 CARPAL TUNNEL SYNDROME OF RIGHT WRIST: Primary | ICD-10-CM

## 2025-07-14 ENCOUNTER — CLINICAL SUPPORT (OUTPATIENT)
Dept: LAB | Facility: HOSPITAL | Age: 70
End: 2025-07-14
Attending: ORTHOPAEDIC SURGERY
Payer: MEDICARE

## 2025-07-14 ENCOUNTER — OFFICE VISIT (OUTPATIENT)
Dept: ORTHOPEDICS | Facility: CLINIC | Age: 70
End: 2025-07-14
Payer: MEDICARE

## 2025-07-14 VITALS
HEART RATE: 78 BPM | HEIGHT: 61 IN | SYSTOLIC BLOOD PRESSURE: 134 MMHG | DIASTOLIC BLOOD PRESSURE: 85 MMHG | WEIGHT: 140 LBS | BODY MASS INDEX: 26.43 KG/M2

## 2025-07-14 DIAGNOSIS — Z01.818 PRE-OP TESTING: ICD-10-CM

## 2025-07-14 DIAGNOSIS — G56.03 BILATERAL CARPAL TUNNEL SYNDROME: ICD-10-CM

## 2025-07-14 DIAGNOSIS — G56.01 RIGHT CARPAL TUNNEL SYNDROME: Primary | ICD-10-CM

## 2025-07-14 LAB
OHS QRS DURATION: 80 MS
OHS QTC CALCULATION: 451 MS

## 2025-07-14 PROCEDURE — 93010 ELECTROCARDIOGRAM REPORT: CPT | Mod: ,,, | Performed by: INTERNAL MEDICINE

## 2025-07-14 PROCEDURE — 99214 OFFICE O/P EST MOD 30 MIN: CPT | Mod: ,,, | Performed by: ORTHOPAEDIC SURGERY

## 2025-07-14 PROCEDURE — 93005 ELECTROCARDIOGRAM TRACING: CPT

## 2025-07-14 RX ORDER — SODIUM CHLORIDE, SODIUM GLUCONATE, SODIUM ACETATE, POTASSIUM CHLORIDE AND MAGNESIUM CHLORIDE 30; 37; 368; 526; 502 MG/100ML; MG/100ML; MG/100ML; MG/100ML; MG/100ML
INJECTION, SOLUTION INTRAVENOUS CONTINUOUS
OUTPATIENT
Start: 2025-07-14

## 2025-07-15 RX ORDER — CYANOCOBALAMIN (VITAMIN B-12) 250 MCG
250 TABLET ORAL DAILY
COMMUNITY

## 2025-07-15 RX ORDER — VITAMIN E 268 MG
400 CAPSULE ORAL
COMMUNITY

## 2025-07-17 ENCOUNTER — ANESTHESIA EVENT (OUTPATIENT)
Dept: SURGERY | Facility: HOSPITAL | Age: 70
End: 2025-07-17
Payer: MEDICARE

## 2025-07-17 NOTE — PROGRESS NOTES
"Subjective:    CC: Results of the Right Wrist (RUE NCS results. No new concerns with it, doing the same.)       HPI:  Patient returns today for repeat exam.  Patient continues to have numbness and tingling in both hands.  She has tried multiple conservative treatments without relief she did have a nerve conduction study, we have discussed her results.    ROS: Refer to HPI for pertinent ROS. All other 12 point systems negative.    Objective:  Vitals:    07/14/25 1100   BP: 134/85   Pulse: 78   Weight: 63.5 kg (139 lb 15.9 oz)   Height: 5' 1" (1.549 m)        Physical Exam:  Bilateral upper extremities compartment soft and warm.  Skin is intact.  There is no signs symptoms of DVT or infection.  She does have numbness and tingling throughout the median nerve distribution positive Tinel and Phalen's, she has difficulty making a full fist full extension, neurovascular intact distally.    Images: . Images Reviewed and discussed with patient.    Assessment:  1. Bilateral carpal tunnel syndrome    2. Pre-op testing  - CBC Auto Differential; Future  - Comprehensive Metabolic Panel; Future  - EKG 12-lead; Future  - Place in Outpatient; Standing  - Vital signs; Standing  - Insert peripheral IV; Standing  - Clip and Prep Other (please specifiy) (Operative site); Standing  - Cleanse with Chlorhexidine (CHG); Standing  - Diet NPO; Standing  - electrolyte-A infusion  - Reason for no VTE Prophylaxis; Standing  - ceFAZolin (Ancef) 2 g in D5W 50 mL IVPB  - POCT urine pregnancy; Standing  - Inpatient consult to Anesthesiology; Standing  - Full code; Standing  - Case Request Operating Room: RELEASE, CARPAL TUNNEL    3. Right carpal tunnel syndrome  - Place in Outpatient; Standing  - Vital signs; Standing  - Insert peripheral IV; Standing  - Clip and Prep Other (please specifiy) (Operative site); Standing  - Cleanse with Chlorhexidine (CHG); Standing  - Diet NPO; Standing  - electrolyte-A infusion  - Reason for no VTE Prophylaxis; " Standing  - ceFAZolin (Ancef) 2 g in D5W 50 mL IVPB  - POCT urine pregnancy; Standing  - Inpatient consult to Anesthesiology; Standing  - Full code; Standing  - Case Request Operating Room: RELEASE, CARPAL TUNNEL        Plan:  At this time we discussed her physical exam and previous imaging findings.  We have discussed a nerve conduction study in detail.  We have discussed conservative treatment and surgical intervention.  The risks benefits and alternatives discussed with the patient in detail.  All questions were answered.  We have discussed the down time rehab process afterwards.  She would like to proceed with a carpal tunnel release right wrist, we will set this up at her convenience.    Follow UP: No follow-ups on file.

## 2025-07-24 NOTE — H&P
Admission History & Physical    Subjective:    CC: No chief complaint on file.       HPI:  Mirela Andrade presents today for preoperative evaluation for carpal tunnel release right wrist. I reviewed the indications for surgery. The risks and benefits of the proposed and alternative treatments were discussed with the patient. Questions pertinent to the procedure were solicited and answered. Dr. Xie was available to answer any questions with instruction to call clinic with any further questions.No assurances were given. Informed consent was obtained. The patient expressed good understanding and wished to proceed with scheduling the procedure.     ROS:   Constitutional: No fever, weakness, or fatigue.   Ear/Nose/Mouth/Throat: No nasal congestion or sore throat.   Respiratory: No shortness of breath or cough.   Cardiovascular: No chest pain, palpitations, or peripheral edema.   Gastrointestinal: No nausea, vomiting, or abdominal pain.   Genitourinary: No dysuria.  Musculoskeletal:  Right hand numbness and tingling    Past Surgical History:   Procedure Laterality Date    ANKLE SURGERY Left     BILATERAL TUBAL LIGATION      CATARACT EXTRACTION W/  INTRAOCULAR LENS IMPLANT Bilateral 2016    ESOPHAGOGASTRODUODENOSCOPY      HYSTERECTOMY      LUMBAR SPINE SURGERY  2019    fusion    right wrist Right 2015    TONSILLECTOMY          Past Medical History:   Diagnosis Date    Asthma     Bipolar disorder, unspecified     Bulging of thoracic intervertebral disc     Carpal tunnel syndrome on right     Closed fracture of one rib of right side     Compression fracture of T11 vertebra     HLD (hyperlipidemia)     HTN (hypertension)     Insomnia     Occlusion and stenosis of bilateral carotid arteries     Other idiopathic peripheral autonomic neuropathy     RLS (restless legs syndrome)     Sciatica         Objective:    There were no vitals filed for this visit.     Physical Exam:    Appearance: No distress, good color on room  air. Alert and cooperative.  HEENT: Normocephalic. PERRLA EOM intact.   Lungs: Breathing unlabored.  Heart: Regular rate and rhythm.  Abdomen: Soft, non-tender.  No rebound tenderness.  Extremities: Bilateral upper extremities compartment soft and warm. Skin is intact. There is no signs symptoms of DVT or infection. She does have numbness and tingling throughout the median nerve distribution positive Tinel and Phalen's, she has difficulty making a full fist full extension, neurovascular intact distally.   Skin: No rashes or open wounds.        Assessment:  Carpal tunnel syndrome right wrist    Plan:  Plan for carpal tunnel release right wrist at [facility]. The patient has been given preoperative instructions and prescriptions for post-operative medication. Post-operative appointment is scheduled for 2 weeks.

## 2025-07-25 ENCOUNTER — HOSPITAL ENCOUNTER (OUTPATIENT)
Facility: HOSPITAL | Age: 70
Discharge: HOME | End: 2025-07-25
Attending: ORTHOPAEDIC SURGERY | Admitting: ORTHOPAEDIC SURGERY
Payer: MEDICARE

## 2025-07-25 ENCOUNTER — ANESTHESIA (OUTPATIENT)
Dept: SURGERY | Facility: HOSPITAL | Age: 70
End: 2025-07-25
Payer: MEDICARE

## 2025-07-25 VITALS
TEMPERATURE: 97 F | SYSTOLIC BLOOD PRESSURE: 128 MMHG | DIASTOLIC BLOOD PRESSURE: 66 MMHG | OXYGEN SATURATION: 99 % | HEART RATE: 58 BPM | WEIGHT: 147.5 LBS | HEIGHT: 61 IN | BODY MASS INDEX: 27.85 KG/M2 | RESPIRATION RATE: 18 BRPM

## 2025-07-25 DIAGNOSIS — Z01.818 PRE-OP TESTING: ICD-10-CM

## 2025-07-25 DIAGNOSIS — G56.01 RIGHT CARPAL TUNNEL SYNDROME: ICD-10-CM

## 2025-07-25 DIAGNOSIS — G89.18 POST-OP PAIN: Primary | ICD-10-CM

## 2025-07-25 PROCEDURE — 37000009 HC ANESTHESIA EA ADD 15 MINS: Performed by: ORTHOPAEDIC SURGERY

## 2025-07-25 PROCEDURE — 63600175 PHARM REV CODE 636 W HCPCS

## 2025-07-25 PROCEDURE — 63600175 PHARM REV CODE 636 W HCPCS: Performed by: ORTHOPAEDIC SURGERY

## 2025-07-25 PROCEDURE — 36000707: Performed by: ORTHOPAEDIC SURGERY

## 2025-07-25 PROCEDURE — 25000003 PHARM REV CODE 250

## 2025-07-25 PROCEDURE — 63600175 PHARM REV CODE 636 W HCPCS: Performed by: ANESTHESIOLOGY

## 2025-07-25 PROCEDURE — 36000706: Performed by: ORTHOPAEDIC SURGERY

## 2025-07-25 PROCEDURE — 64415 NJX AA&/STRD BRCH PLXS IMG: CPT | Performed by: ANESTHESIOLOGY

## 2025-07-25 PROCEDURE — 64721 CARPAL TUNNEL SURGERY: CPT | Mod: RT,,, | Performed by: ORTHOPAEDIC SURGERY

## 2025-07-25 PROCEDURE — 71000015 HC POSTOP RECOV 1ST HR: Performed by: ORTHOPAEDIC SURGERY

## 2025-07-25 PROCEDURE — 37000008 HC ANESTHESIA 1ST 15 MINUTES: Performed by: ORTHOPAEDIC SURGERY

## 2025-07-25 RX ORDER — METHOCARBAMOL 500 MG/1
500 TABLET, FILM COATED ORAL EVERY 6 HOURS PRN
Status: DISCONTINUED | OUTPATIENT
Start: 2025-07-25 | End: 2025-07-25 | Stop reason: HOSPADM

## 2025-07-25 RX ORDER — ONDANSETRON HYDROCHLORIDE 2 MG/ML
INJECTION, SOLUTION INTRAVENOUS
Status: DISCONTINUED | OUTPATIENT
Start: 2025-07-25 | End: 2025-07-25

## 2025-07-25 RX ORDER — MEPERIDINE HYDROCHLORIDE 25 MG/ML
12.5 INJECTION INTRAMUSCULAR; INTRAVENOUS; SUBCUTANEOUS ONCE AS NEEDED
Refills: 0 | OUTPATIENT
Start: 2025-07-25 | End: 2025-07-26

## 2025-07-25 RX ORDER — ROPIVACAINE HYDROCHLORIDE 5 MG/ML
INJECTION, SOLUTION EPIDURAL; INFILTRATION; PERINEURAL
Status: COMPLETED | OUTPATIENT
Start: 2025-07-25 | End: 2025-07-25

## 2025-07-25 RX ORDER — SODIUM CHLORIDE, SODIUM GLUCONATE, SODIUM ACETATE, POTASSIUM CHLORIDE AND MAGNESIUM CHLORIDE 30; 37; 368; 526; 502 MG/100ML; MG/100ML; MG/100ML; MG/100ML; MG/100ML
INJECTION, SOLUTION INTRAVENOUS CONTINUOUS
Status: DISCONTINUED | OUTPATIENT
Start: 2025-07-25 | End: 2025-07-25 | Stop reason: HOSPADM

## 2025-07-25 RX ORDER — MIDAZOLAM HYDROCHLORIDE 2 MG/2ML
2 INJECTION, SOLUTION INTRAMUSCULAR; INTRAVENOUS ONCE AS NEEDED
OUTPATIENT
Start: 2025-07-25 | End: 2036-12-20

## 2025-07-25 RX ORDER — INSULIN ASPART 100 [IU]/ML
0-5 INJECTION, SOLUTION INTRAVENOUS; SUBCUTANEOUS EVERY 4 HOURS PRN
OUTPATIENT
Start: 2025-07-25

## 2025-07-25 RX ORDER — ONDANSETRON HYDROCHLORIDE 2 MG/ML
4 INJECTION, SOLUTION INTRAVENOUS EVERY 6 HOURS PRN
Status: DISCONTINUED | OUTPATIENT
Start: 2025-07-25 | End: 2025-07-25 | Stop reason: HOSPADM

## 2025-07-25 RX ORDER — HYDROMORPHONE HYDROCHLORIDE 2 MG/ML
0.2 INJECTION, SOLUTION INTRAMUSCULAR; INTRAVENOUS; SUBCUTANEOUS EVERY 5 MIN PRN
Refills: 0 | OUTPATIENT
Start: 2025-07-25

## 2025-07-25 RX ORDER — MIDAZOLAM HYDROCHLORIDE 2 MG/2ML
INJECTION, SOLUTION INTRAMUSCULAR; INTRAVENOUS
Status: COMPLETED
Start: 2025-07-25 | End: 2025-07-25

## 2025-07-25 RX ORDER — CALCIUM CARBONATE 200(500)MG
500 TABLET,CHEWABLE ORAL 3 TIMES DAILY PRN
Status: DISCONTINUED | OUTPATIENT
Start: 2025-07-25 | End: 2025-07-25 | Stop reason: HOSPADM

## 2025-07-25 RX ORDER — ONDANSETRON 4 MG/1
4 TABLET, ORALLY DISINTEGRATING ORAL EVERY 6 HOURS PRN
OUTPATIENT
Start: 2025-07-25

## 2025-07-25 RX ORDER — CEFAZOLIN 2 G/1
2 INJECTION, POWDER, FOR SOLUTION INTRAMUSCULAR; INTRAVENOUS
Status: DISCONTINUED | OUTPATIENT
Start: 2025-07-25 | End: 2025-07-25 | Stop reason: HOSPADM

## 2025-07-25 RX ORDER — MORPHINE SULFATE 4 MG/ML
4 INJECTION, SOLUTION INTRAMUSCULAR; INTRAVENOUS
Refills: 0 | Status: DISCONTINUED | OUTPATIENT
Start: 2025-07-25 | End: 2025-07-25 | Stop reason: HOSPADM

## 2025-07-25 RX ORDER — LIDOCAINE HYDROCHLORIDE 10 MG/ML
1 INJECTION, SOLUTION EPIDURAL; INFILTRATION; INTRACAUDAL; PERINEURAL ONCE
OUTPATIENT
Start: 2025-07-25 | End: 2025-07-25

## 2025-07-25 RX ORDER — HYDROCODONE BITARTRATE AND ACETAMINOPHEN 5; 325 MG/1; MG/1
1 TABLET ORAL EVERY 6 HOURS PRN
Qty: 12 TABLET | Refills: 0 | Status: SHIPPED | OUTPATIENT
Start: 2025-07-25 | End: 2025-07-31 | Stop reason: SDUPTHER

## 2025-07-25 RX ORDER — PROPOFOL 10 MG/ML
VIAL (ML) INTRAVENOUS
Status: DISCONTINUED | OUTPATIENT
Start: 2025-07-25 | End: 2025-07-25

## 2025-07-25 RX ORDER — SODIUM CITRATE AND CITRIC ACID MONOHYDRATE 334; 500 MG/5ML; MG/5ML
30 SOLUTION ORAL
OUTPATIENT
Start: 2025-07-25

## 2025-07-25 RX ORDER — KETAMINE HYDROCHLORIDE 50 MG/ML
INJECTION, SOLUTION INTRAMUSCULAR; INTRAVENOUS
Status: DISCONTINUED | OUTPATIENT
Start: 2025-07-25 | End: 2025-07-25

## 2025-07-25 RX ORDER — ROPIVACAINE HYDROCHLORIDE 5 MG/ML
INJECTION, SOLUTION EPIDURAL; INFILTRATION; PERINEURAL
Status: COMPLETED
Start: 2025-07-25 | End: 2025-07-25

## 2025-07-25 RX ORDER — SODIUM CHLORIDE 9 MG/ML
INJECTION, SOLUTION INTRAVENOUS CONTINUOUS
Status: DISCONTINUED | OUTPATIENT
Start: 2025-07-25 | End: 2025-07-25 | Stop reason: HOSPADM

## 2025-07-25 RX ORDER — SODIUM CHLORIDE, SODIUM LACTATE, POTASSIUM CHLORIDE, CALCIUM CHLORIDE 600; 310; 30; 20 MG/100ML; MG/100ML; MG/100ML; MG/100ML
INJECTION, SOLUTION INTRAVENOUS CONTINUOUS
OUTPATIENT
Start: 2025-07-25

## 2025-07-25 RX ORDER — METOCLOPRAMIDE HYDROCHLORIDE 5 MG/ML
10 INJECTION INTRAMUSCULAR; INTRAVENOUS EVERY 6 HOURS PRN
Status: DISCONTINUED | OUTPATIENT
Start: 2025-07-25 | End: 2025-07-25 | Stop reason: HOSPADM

## 2025-07-25 RX ORDER — ACETAMINOPHEN 10 MG/ML
1000 INJECTION, SOLUTION INTRAVENOUS ONCE
OUTPATIENT
Start: 2025-07-25 | End: 2025-07-25

## 2025-07-25 RX ORDER — ALUMINUM HYDROXIDE, MAGNESIUM HYDROXIDE, AND SIMETHICONE 1200; 120; 1200 MG/30ML; MG/30ML; MG/30ML
30 SUSPENSION ORAL EVERY 6 HOURS PRN
Status: DISCONTINUED | OUTPATIENT
Start: 2025-07-25 | End: 2025-07-25 | Stop reason: HOSPADM

## 2025-07-25 RX ORDER — HYDROCODONE BITARTRATE AND ACETAMINOPHEN 5; 325 MG/1; MG/1
1 TABLET ORAL EVERY 4 HOURS PRN
Refills: 0 | Status: DISCONTINUED | OUTPATIENT
Start: 2025-07-25 | End: 2025-07-25 | Stop reason: HOSPADM

## 2025-07-25 RX ADMIN — MIDAZOLAM HYDROCHLORIDE 2 MG: 1 INJECTION, SOLUTION INTRAMUSCULAR; INTRAVENOUS at 07:07

## 2025-07-25 RX ADMIN — ONDANSETRON HYDROCHLORIDE 4 MG: 2 SOLUTION INTRAMUSCULAR; INTRAVENOUS at 07:07

## 2025-07-25 RX ADMIN — CEFAZOLIN 2 G: 2 INJECTION, POWDER, FOR SOLUTION INTRAMUSCULAR; INTRAVENOUS at 07:07

## 2025-07-25 RX ADMIN — PROPOFOL 70 MG: 10 INJECTION, EMULSION INTRAVENOUS at 07:07

## 2025-07-25 RX ADMIN — ROPIVACAINE HYDROCHLORIDE 15 ML: 5 INJECTION, SOLUTION EPIDURAL; INFILTRATION; PERINEURAL at 07:07

## 2025-07-25 RX ADMIN — SODIUM CHLORIDE, SODIUM GLUCONATE, SODIUM ACETATE, POTASSIUM CHLORIDE AND MAGNESIUM CHLORIDE: 526; 502; 368; 37; 30 INJECTION, SOLUTION INTRAVENOUS at 07:07

## 2025-07-25 RX ADMIN — PROPOFOL 100 MCG/KG/MIN: 10 INJECTION, EMULSION INTRAVENOUS at 07:07

## 2025-07-25 RX ADMIN — KETAMINE HYDROCHLORIDE 20 MG: 50 INJECTION INTRAMUSCULAR; INTRAVENOUS at 07:07

## 2025-07-25 NOTE — BRIEF OP NOTE
[place] [place] [facility] [facility]  Brief Operative Note    Surgery Date: 7/25/2025     Surgeons and Role:     * Jean Xie MD - Primary    Assisting Surgeon: None    Pre-op Diagnosis:  Pre-op testing [Z01.818]  Right carpal tunnel syndrome [G56.01]    Post-op Diagnosis:  Post-Op Diagnosis Codes:     * Pre-op testing [Z01.818]     * Right carpal tunnel syndrome [G56.01]    Procedure(s) (LRB):  CARPAL TUNNEL RELEASE  block stretcher (Right)    Anesthesia: Regional    Operative Findings: R CTS    Estimated Blood Loss: * No values recorded between 7/25/2025  7:16 AM and 7/25/2025  7:47 AM *         Specimens:   Specimen (24h ago, onward)      None            * No specimens in log *        Discharge Note    OUTCOME: Patient tolerated treatment/procedure well without complication and is now ready for discharge.    DISPOSITION: Home or Self Care    FINAL DIAGNOSIS:  Right carpal tunnel syndrome    FOLLOWUP: In clinic    DISCHARGE INSTRUCTIONS:    Discharge Procedure Orders   Diet general     Activity as tolerated     Keep surgical extremity elevated     Ice to affected area     Lifting restrictions     No driving, operating heavy equipment or signing legal documents while taking pain medication.     Other restrictions (specify):   Order Comments: Okay to wean sling as tolerated.     Remove dressing in 72 hours     Wound care routine (specify)   Order Comments: Wound care routine: keep dressing clean, dry, and intact. Ok to remove in 3 days. Ok to shower once removed. No submersion.     Call MD for:  temperature >100.4     Call MD for:  persistent nausea and vomiting     Call MD for:  severe uncontrolled pain     Call MD for:  difficulty breathing, headache or visual disturbances     Call MD for:  redness, tenderness, or signs of infection (pain, swelling, redness, odor or green/yellow discharge around incision site)     Call MD for:  hives     Call MD for:  persistent dizziness or light-headedness      Call MD for:  extreme fatigue     Shower on day dressing removed (No bath)

## 2025-07-25 NOTE — ANESTHESIA PREPROCEDURE EVALUATION
07/25/2025    Mirela Andrade is a 69 y.o., female.    Pre-operative evaluation for Procedure(s) (LRB):  CARPAL TUNNEL RELEASE  block stretcher (Right)    Pre-op Assessment    I have reviewed the Patient Summary Reports.     I have reviewed the Nursing Notes. I have reviewed the NPO Status.   I have reviewed the Medications.     Review of Systems  Anesthesia Hx:  No problems with previous Anesthesia                Cardiovascular:  Exercise tolerance: good                                                 Past Medical History:   Diagnosis Date    Asthma     Bipolar disorder, unspecified     Bulging of thoracic intervertebral disc     Carpal tunnel syndrome on right     Closed fracture of one rib of right side     Compression fracture of T11 vertebra     HLD (hyperlipidemia)     HTN (hypertension)     Insomnia     Occlusion and stenosis of bilateral carotid arteries     Other idiopathic peripheral autonomic neuropathy     RLS (restless legs syndrome)     Sciatica        Problem List[1]    Review of patient's allergies indicates:   Allergen Reactions    Clopidogrel Other (See Comments)     Other reaction(s): protruding vessels, unknown    Penicillin      Other reaction(s): rash, hives    Ibuprofen Nausea Only and Other (See Comments)       Current Outpatient Medications   Medication Instructions    ALPRAZolam (XANAX) 1 mg, 2 times daily PRN    cyanocobalamin (VITAMIN B-12) 250 mcg, Daily    cyclobenzaprine (FLEXERIL) 5 mg, Oral, 3 times daily PRN    diclofenac (VOLTAREN) 75 mg, 2 times daily    ezetimibe (ZETIA) 10 mg, Nightly    HYDROcodone-acetaminophen (NORCO) 5-325 mg per tablet 1 tablet, Oral, Every 6 hours PRN    rosuvastatin (CRESTOR) 20 mg, Nightly    valsartan-hydrochlorothiazide (DIOVAN-HCT) 320-12.5 mg per tablet 1 tablet, Daily    vitamin E 400 Units, As needed (PRN)       Past Surgical History:   Procedure Laterality Date    ANKLE SURGERY Left     BILATERAL TUBAL LIGATION      CATARACT EXTRACTION W/   "INTRAOCULAR LENS IMPLANT Bilateral 2016    ESOPHAGOGASTRODUODENOSCOPY      HYSTERECTOMY      LUMBAR SPINE SURGERY  2019    fusion    right wrist Right 2015    TONSILLECTOMY         Social History[2]    /87   Pulse 80   Temp 35.7 °C (96.2 °F) (Tympanic)   Resp 20   Ht 5' 1" (1.549 m)   Wt 66.9 kg (147 lb 7.8 oz)   LMP  (LMP Unknown)   SpO2 100%   Breastfeeding No   BMI 27.87 kg/m²       Physical Exam  General: Well nourished and Cooperative    Airway:  Mallampati: II   Mouth Opening: Normal  TM Distance: Normal  Tongue: Normal  Neck ROM: Normal ROM    Dental:  Intact    Chest/Lungs:  Clear to auscultation    Heart:  Rhythm: Regular Rhythm        Lab Results   Component Value Date    WBC 6.58 07/14/2025    HGB 14.6 07/14/2025    HCT 43.4 07/14/2025    MCV 96.7 (H) 07/14/2025     07/14/2025          BMP  Lab Results   Component Value Date    HCT 43.4 07/14/2025     07/14/2025    K 4.1 07/14/2025    BUN 21.1 (H) 07/14/2025    CREATININE 0.73 07/14/2025    CALCIUM 9.4 07/14/2025        INR  No results for input(s): "PT", "INR", "PROTIME", "APTT" in the last 72 hours.      Diagnostic Studies:  .  EKG  Results for orders placed or performed in visit on 07/14/25   EKG 12-lead    Collection Time: 07/14/25 11:22 AM   Result Value Ref Range    QRS Duration 80 ms    [facility] QTC Calculation 451 ms    Narrative    Test Reason : Z01.818,    Vent. Rate :  78 BPM     Atrial Rate :  78 BPM     P-R Int : 140 ms          QRS Dur :  80 ms      QT Int : 396 ms       P-R-T Axes : -14  68  65 degrees    QTcB Int : 451 ms    Normal sinus rhythm  Normal ECG  Confirmed by Gulshan Chirinos ([id number]) on 7/14/2025 8:35:06 PM    Referred By: JOSE RAMON ARTIS           Confirmed By: Gulshan Chirinos         Anesthesia Plan  Type of Anesthesia, risks & benefits discussed:    Anesthesia Type: Regional  Intra-op Monitoring Plan: Standard ASA Monitors  Post Op Pain Control Plan: multimodal analgesia  Informed Consent: Informed consent signed " with the Patient and all parties understand the risks and agree with anesthesia plan.  All questions answered.   ASA Score: 2  Day of Surgery Review of History & Physical: H&P Update referred to the surgeon/provider.    Ready For Surgery From Anesthesia Perspective.     .  Anesthesia consent includes material facts, risks, complications & alternatives, and possibility of altering the anesthesia plan due to intraoperative conditions.    I reviewed problem list, prior to admission medication list, appropriate labs, any workup, Xray, EKG etc   See anesthesia chart for details of the anesthesia plan carried out.       Zhao Irving MD    ¤              [1] There is no problem list on file for this patient.  [2]   Social History  Socioeconomic History    Marital status:    Tobacco Use    Smoking status: Every Day     Current packs/day: 0.50     Average packs/day: 0.5 packs/day for 39.0 years (19.5 ttl pk-yrs)     Types: Cigarettes     Start date: 7/15/1986    Smokeless tobacco: Never   Substance and Sexual Activity    Alcohol use: Yes     Comment: socially    Drug use: Never    Sexual activity: Not Currently

## 2025-07-25 NOTE — ANESTHESIA POSTPROCEDURE EVALUATION
Anesthesia Post Evaluation    Patient: Mirela Andrade    Procedure(s) Performed: Procedure(s) (LRB):  CARPAL TUNNEL RELEASE  block stretcher (Right)    Final Anesthesia Type: regional      Patient location during evaluation: PACU  Patient participation: Yes- Able to Participate  Post-procedure mental status: @ basline.  Pain management: adequate  AQI66 TOM Mitigation: See pacu RN note for any measures applied.  PONV status: See postop meds for drugs used to control n/v if any.  Anesthetic complications: no      Cardiovascular status: stable  Respiratory status: @ baseline.  Hydration status: euvolemic                Stable n block      Pain/Candy Score: No data recorded

## 2025-07-25 NOTE — ANESTHESIA PROCEDURE NOTES
Peripheral Block axillary    Patient location during procedure: pre-op   Block not for primary anesthetic.  Reason for block: at surgeon's request and post-op pain management   Post-op Pain Location: R wrist surgical block   Start time: 7/25/2025 7:07 AM  Timeout: 7/25/2025 7:07 AM   End time: 7/25/2025 7:16 AM    Staffing  Authorizing Provider: Zhao Irving MD  Performing Provider: Zhao Irving MD    Staffing  Performed by: Zhao Irving MD  Authorized by: Zhao Irving MD    Preanesthetic Checklist  Completed: patient identified, IV checked, site marked, risks and benefits discussed, surgical consent, monitors and equipment checked, pre-op evaluation and timeout performed  Peripheral Block  Patient position: supine  Prep: ChloraPrep and Mask worn, hand hygeine performed, sterile gloves worn  Patient monitoring: cardiac monitor, continuous pulse ox and frequent blood pressure checks (RN monitoring vitals throughout procedure.)  Block type: axillary  Laterality: right  Injection technique: single shot  Needle  Needle type: Stimuplex   Needle gauge: 21 G  Needle length: 2 in  Needle localization: anatomical landmarks and ultrasound guidance  Needle insertion depth: 3 cm   -ultrasound image captured on disc.  Assessment  Injection assessment: negative aspiration, negative parasthesia and local visualized surrounding nerve  Paresthesia pain: none  Heart rate change: no  Slow fractionated injection: yes  Pain Tolerance: comfortable throughout block  Medications:    Medications: ropivacaine (NAROPIN) injection 0.5% - Perineural   15 mL - 7/25/2025 7:14:00 AM    Additional Notes  Block..See EMR for any note re current/previous paresthesia, dysesthesias or chronic pain complaints in limb to be blocked.  US used to observe needle trajectory, needle placed in close proximity to appropriate nerve structures, they appeared anatomically normal.  Deposition of LA in close proximity to nerve structures  observed.  Perm image saved.   No resistance to injection was encountered.  No paraesthesia or aspiration of blood observed.  No pain with injection.  See anesthesia chart for any drugs used to supplement the block. Sedation was provided at a level for patient to be able to communicate any discomfort.

## 2025-07-25 NOTE — TRANSFER OF CARE
"Anesthesia Transfer of Care Note    Patient: Mirela Andrade    Procedure(s) Performed: Procedure(s) (LRB):  CARPAL TUNNEL RELEASE  block stretcher (Right)    Patient location: OPS    Anesthesia Type: general    Transport from OR: Transported from OR on room air with adequate spontaneous ventilation    Post pain: adequate analgesia    Post assessment: no apparent anesthetic complications    Post vital signs: stable    Level of consciousness: awake    Nausea/Vomiting: no nausea/vomiting    Complications: none    Transfer of care protocol was followed      Last vitals: Visit Vitals  BP (!) 140/80   Pulse 65   Temp 35.7 °C (96.2 °F) (Tympanic)   Resp 16   Ht 5' 1" (1.549 m)   Wt 66.9 kg (147 lb 7.8 oz)   LMP  (LMP Unknown)   SpO2 100%   Breastfeeding No   BMI 27.87 kg/m²     "

## 2025-07-25 NOTE — OP NOTE
DATE OF SURGERY: 7/25/2025    SURGEON: Jean Xie MD    HOSPITAL:  [facility]    PREOPERATIVE DIAGNOSES: Carpal tunnel syndrome, right wrist.     POSTOPERATIVE DIAGNOSES: Same as above    PROCEDURES: Carpal tunnel release, right wrist.    ANESTHESIA: Axillary block, IV sedation.    IV FLUIDS: As per Anesthesia.    ESTIMATED BLOOD LOSS: Less than 5 cc.    COUNTS: Correct.    COMPLICATIONS: None.    CONDITION: Stable to PACU.    INDICATIONS FOR PROCEDURE: Mirela Andrade is a 69 y.o.  year old female who has been followed in my clinic.  The patient has severe carpal tunnel and has failed conservative treatment.  A nerve conduction study was performed demonstrating the above findings.  The risks, benefits, and alternatives were discussed with the patient in detail.  All questions were answered.  Informed consent was obtained.    PROCEDURE IN DETAIL: The patient was found in the preoperative holding by Anesthesia and found fit for surgery.  The patient was taken to the operating room and placed on the operating table in supine position.  All bony prominences were well padded.  Time-out was called to identify correct patient, correct procedure, correct site, all were in agreement.  The patient underwent IV sedation.  The patient was then prepped and draped in normal sterile fashion, leaving the right upper extremity exposed for surgery.  A well-padded tourniquet was placed on the right upper arm.  Approximate tourniquet time was 8 minutes at 250.  The patient received preoperative antibiotics.  After exsanguination of the affected upper extremity, tourniquet was inflated.  A 2.5 cm incision was made over the volar aspect of the right wrist in line with the radial aspect of the fourth finger.  Soft tissue dissection was carried down to the transverse carpal ligament where it was then incised.  The median nerve was completely flattened in the carpal tunnel.  After complete release of carpal tunnel, tourniquet was  released.  Hemostasis achieved.  Copious irrigation was used to wash the wound.  The incision was then closed with 3-0 nylon suture in standard fashion.  Xeroform, 4 x 4's, soft tissue dressing, Ace wrap, and a sling was placed on the affected upper extremity.  The patient was then awoken by Anesthesia and brought to PACU in stable condition.        ______________________________  Jean Xie MD

## 2025-07-25 NOTE — DISCHARGE INSTRUCTIONS
[facility] [place]CHAR[place]. [address]  [phone]      You may resume home medications unless otherwise instructed by you doctor.  Most blood thinners can be resumed day after surgery.    DIET    YOUR FIRST MEAL SHOULD BE LIQUID: I.E. SOUPS, JELLO, JUICE. IF YOUR LIQUID MEAL IS TOLERATED WELL THEN YOU MAY PROGRESS TO A SMALL LIGHT MEAL.   IF NAUSEA AND VOMITING OCCUR RETURN TO THE LIQUID DIET AND PROGRESS TO A NORMAL SOLID DIET SLOWLY.  IF THE NAUSEA AND VOMITING DOES NOT STOP, NOTIFY YOUR HEALTH CARE PROVIDER.      GENERAL ANESTHESIA OR SEDATION    DO NOT DRIVE OR PARTICIPATE IN ANY ACTIVITIES THAT REQUIRE COORDINATION FOR THE NEXT 24 HOURS: I.E. SWIMMING, BIKING, OPERATING HEAVY MACHINERY, COOKING, USING POWER TOOLS, CLIMBING LADDERS.   FOR THE NEXT 24 HOURS DO NOT: DRIVE, DRINK ALCOHOL, MAKE ANY IMPORTANT DECISIONS OR SIGN ANY LEGAL DOCUMENTS.   STAY WITH AN ADULT DURING THE 24 HOURS AFTER YOUR SURGERY.   DRINK ENOUGH FLUIDS TO KEEP YOUR URINE CLEAR TO PALE YELLOW.      PREVENTING CONSTIPATION AFTER SURGERY    EAT FOOD HIGH IN FIBER AND DRINK PLENTY OF FLUIDS, ESPECIALLY WATER.   YOU MAY TAKE AN OVER THE COUNTER FIBER SUPPLEMENT OR STOOL SOFTENER AS DIRECTED ON THE PACKAGE.   STAYING MOBILE, IF POSSIBLE, HELPS TO PREVENT CONSTIPATION.  CONTACT YOUR DOCTOR IF YOU HAVE NOT HAD A BOWEL MOVEMENT IN 3 DAYS AFTER SURGERY.       INFECTION CONTROL    KEEP YOUR DRESSING CLEAN, DRY AND INTACT.   FOLLOW PHYSICIAN INSTRUCTIONS REGARDING REMOVAL OF DRESSING.  DO NOT TOUCH SURGICAL SITE OR APPLY LOTIONS, POWDERS, CREAMS OR OINTMENTS ON YOUR INCISION UNLESS INSTRUCTED TO DO SO BY YOUR HEALTH CARE PROVIDER.  ONCE THE DRESSING HAS BEEN REMOVED, CHECK THE INCISION SITE DAILY FOR: INCREASED REDNESS, SWELLING, FLUID OR BLOOD, WARMTH, PUS, FOUL SMELL OR INCREASED PAIN.      DEEP VEIN THROMBOSIS (DVT)    A DVT IS A BLOOD CLOT THAT CAN DEVELOP IN THE DEEP AND LARGER VEINS OF THE LEG, ARM OR  PELVIS.   RISK FACTORS INCLUDE SITTING OR LYING FOR LONG PERIODS OF TIME. THIS INCLUDES RECOVERING FROM SURGERY.  PREVENTION INCLUDES: AVOID SITTING STILL FOR LONG PERIODS WITHOUT MOVING YOUR LEGS, DO NOT SMOKE AND IF POSSIBLE AVOID MEDICATIONS THAT CONTAIN ESTROGEN.   SIGNS AND SYMPTOMS THAT SHOULD BE REPORTED IMMEDIATELY INCLUDE: SWELLING IN AN ARM OR LEG, WARMTH, REDNESS OR PAIN IN ONE AREA OF THE LEG OR ARM THAT DOES NOT COME FROM THE INCISION. IF THE CLOT IS IN YOUR LEG, THE SYMPTOMS WILL BE WORSE WHEN STANDING OR WALKING.   SIGNS OF A PULMONARY EMBOLISM OR PE (A CLOT THAT MOVED TO YOUR LUNG): SHORTNESS OF BREATH, COUGHING , ESPECIALLY IF ACCOMPANIED WITH BLOODY MUCUS, CHEST PAIN OR RAPID HEART RATE.  IF YOU EXPERIENCE THESE SYMPTOMS, YOU SHOULD GET EMERGENCY TREATMENT RIGHT AWAY. DO NOT WAIT TO SEE IF THESE SYMPTOMS WILL GO AWAY. DO NOT DRIVE YOURSELF TO THE HOSPITAL.       CONTACT YOUR HEALTH CARE PROVIDER IF:    YOU HAVE REDNESS, SWELLING OR PAIN AROUND YOUR INCISION.  YOUR INCISION FEELS WARM TO THE TOUCH OR IS LEAKING EXCESSIVE FLUID/ BLOOD.  YOUR SUTURES OR STAPLES HAVE COME UNDONE.  YOU HAVE A TEMPERATURE .5 OR GREATER.  YOU ARE NOT ABLE TO URINATE WITHIN 6 HOURS AFTER SURGERY.  YOU HAVE UNRESOLVED NAUSEA AND VOMITING.       SEEK IMMEDIATE MEDICAL CARE IF:    YOU HAVE PERSISTENT NAUSEA AND VOMITING.   YOU ARE UNABLE TO EAT OR DRINK.   YOU HAVE DIFFICULTY SPEAKING AND/ OR BREATHING.  YOUR SKIN COLOR APPEARS BLUE OR GRAY.  YOU HAVE A RED STREAK COMING FROM YOUR INCISION.  YOUR INCISION BLEEDS THROUGH THE DRESSING AND DOES NOT STOP WITH GENTLY PRESSURE.  YOU HAVE SEVERE PAIN THAT DOES NOT DECREASE WITH YOUR MEDICATIONS.

## 2025-07-31 DIAGNOSIS — G56.01 RIGHT CARPAL TUNNEL SYNDROME: ICD-10-CM

## 2025-07-31 DIAGNOSIS — G89.18 POST-OP PAIN: ICD-10-CM

## 2025-07-31 RX ORDER — HYDROCODONE BITARTRATE AND ACETAMINOPHEN 5; 325 MG/1; MG/1
1 TABLET ORAL EVERY 6 HOURS PRN
Qty: 12 TABLET | Refills: 0 | Status: SHIPPED | OUTPATIENT
Start: 2025-07-31

## 2025-08-07 ENCOUNTER — OFFICE VISIT (OUTPATIENT)
Dept: ORTHOPEDICS | Facility: CLINIC | Age: 70
End: 2025-08-07
Payer: MEDICARE

## 2025-08-07 VITALS
DIASTOLIC BLOOD PRESSURE: 83 MMHG | WEIGHT: 147.5 LBS | HEIGHT: 61 IN | SYSTOLIC BLOOD PRESSURE: 130 MMHG | HEART RATE: 84 BPM | BODY MASS INDEX: 27.85 KG/M2

## 2025-08-07 DIAGNOSIS — G56.01 RIGHT CARPAL TUNNEL SYNDROME: Primary | ICD-10-CM

## 2025-08-07 PROCEDURE — 99024 POSTOP FOLLOW-UP VISIT: CPT | Mod: POP,,, | Performed by: ORTHOPAEDIC SURGERY

## 2025-08-15 DIAGNOSIS — G89.18 POST-OP PAIN: ICD-10-CM

## 2025-08-15 DIAGNOSIS — G56.01 RIGHT CARPAL TUNNEL SYNDROME: ICD-10-CM

## 2025-08-15 RX ORDER — HYDROCODONE BITARTRATE AND ACETAMINOPHEN 5; 325 MG/1; MG/1
1 TABLET ORAL EVERY 8 HOURS PRN
Qty: 12 TABLET | Refills: 0 | Status: SHIPPED | OUTPATIENT
Start: 2025-08-15

## (undated) DEVICE — Device

## (undated) DEVICE — BANDAGE VELCLOSE ELAS 3INX5YD

## (undated) DEVICE — GLOVE 8.5 PROTEXIS PI BLUE

## (undated) DEVICE — GOWN POLY REINF X-LONG 2XL

## (undated) DEVICE — DRAPE STERI U-SHAPED 47X51IN

## (undated) DEVICE — ELECTRODE PATIENT RETURN DISP

## (undated) DEVICE — GLOVE 6.5 PROTEXIS PI BLUE

## (undated) DEVICE — DRESSING XEROFORM NONADH 1X8IN

## (undated) DEVICE — DRAPE HAND STERILE

## (undated) DEVICE — SUT 3-0 ETHILON 18 FS-1

## (undated) DEVICE — APPLICATOR CHLORAPREP ORN 26ML

## (undated) DEVICE — GLOVE PROTEXIS HYDROGEL SZ6.5

## (undated) DEVICE — GLOVE PROTEXIS LTX MICRO 8

## (undated) DEVICE — CUFF ATS 2 PORT SNGL BLDR 18IN

## (undated) DEVICE — SPONGE COTTON TRAY 4X4IN

## (undated) DEVICE — SOL NACL IRR 1000ML BTL

## (undated) DEVICE — PAD CAST SOF-ROL RAYON 4INX4YD

## (undated) DEVICE — SLING ARM MEDIUM FOAM STRAP